# Patient Record
Sex: FEMALE | Race: WHITE | NOT HISPANIC OR LATINO | Employment: FULL TIME | URBAN - METROPOLITAN AREA
[De-identification: names, ages, dates, MRNs, and addresses within clinical notes are randomized per-mention and may not be internally consistent; named-entity substitution may affect disease eponyms.]

---

## 2023-07-24 ENCOUNTER — APPOINTMENT (OUTPATIENT)
Dept: PRIMARY CARE | Facility: CLINIC | Age: 48
End: 2023-07-24
Payer: COMMERCIAL

## 2023-07-26 ENCOUNTER — OFFICE VISIT (OUTPATIENT)
Dept: PRIMARY CARE | Facility: CLINIC | Age: 48
End: 2023-07-26
Payer: COMMERCIAL

## 2023-07-26 VITALS
OXYGEN SATURATION: 98 % | WEIGHT: 166 LBS | DIASTOLIC BLOOD PRESSURE: 78 MMHG | SYSTOLIC BLOOD PRESSURE: 122 MMHG | BODY MASS INDEX: 30.36 KG/M2 | HEART RATE: 107 BPM

## 2023-07-26 DIAGNOSIS — Z12.11 SCREENING FOR COLON CANCER: ICD-10-CM

## 2023-07-26 DIAGNOSIS — Z00.00 WELLNESS EXAMINATION: ICD-10-CM

## 2023-07-26 DIAGNOSIS — F41.8 DEPRESSION WITH ANXIETY: Primary | ICD-10-CM

## 2023-07-26 PROBLEM — I82.409 DVT (DEEP VENOUS THROMBOSIS) (MULTI): Status: RESOLVED | Noted: 2023-07-26 | Resolved: 2023-07-26

## 2023-07-26 PROBLEM — D68.9 COAGULATION DISORDER (MULTI): Status: ACTIVE | Noted: 2023-07-26

## 2023-07-26 PROBLEM — J06.9 VIRAL UPPER RESPIRATORY TRACT INFECTION: Status: RESOLVED | Noted: 2023-07-26 | Resolved: 2023-07-26

## 2023-07-26 PROBLEM — E55.9 VITAMIN D DEFICIENCY: Status: RESOLVED | Noted: 2023-07-26 | Resolved: 2023-07-26

## 2023-07-26 PROBLEM — L81.1 MELASMA: Status: RESOLVED | Noted: 2023-07-26 | Resolved: 2023-07-26

## 2023-07-26 PROBLEM — J35.8 TONSIL STONE: Status: RESOLVED | Noted: 2023-07-26 | Resolved: 2023-07-26

## 2023-07-26 PROBLEM — N94.9 VAGINAL LUMP: Status: RESOLVED | Noted: 2023-07-26 | Resolved: 2023-07-26

## 2023-07-26 PROBLEM — J32.9 SINUSITIS: Status: RESOLVED | Noted: 2023-07-26 | Resolved: 2023-07-26

## 2023-07-26 PROBLEM — G43.909 HEADACHE, MIGRAINE: Status: ACTIVE | Noted: 2023-07-26

## 2023-07-26 PROBLEM — J39.2 OROPHARYNGEAL LESION: Status: RESOLVED | Noted: 2023-07-26 | Resolved: 2023-07-26

## 2023-07-26 PROBLEM — R87.612 LOW GRADE SQUAMOUS INTRAEPITHELIAL LESION ON CYTOLOGIC SMEAR OF CERVIX (LGSIL): Status: RESOLVED | Noted: 2023-07-26 | Resolved: 2023-07-26

## 2023-07-26 PROCEDURE — 99213 OFFICE O/P EST LOW 20 MIN: CPT | Performed by: FAMILY MEDICINE

## 2023-07-26 RX ORDER — DESVENLAFAXINE 100 MG/1
100 TABLET, EXTENDED RELEASE ORAL DAILY
Qty: 90 TABLET | Refills: 1 | Status: SHIPPED | OUTPATIENT
Start: 2023-07-26 | End: 2023-08-28 | Stop reason: SDUPTHER

## 2023-07-26 RX ORDER — TOPIRAMATE 50 MG/1
TABLET, FILM COATED ORAL
COMMUNITY
Start: 2012-03-27 | End: 2023-08-28 | Stop reason: SDUPTHER

## 2023-07-26 RX ORDER — ACETAMINOPHEN 500 MG
TABLET ORAL
COMMUNITY

## 2023-07-26 RX ORDER — DESVENLAFAXINE 100 MG/1
1 TABLET, EXTENDED RELEASE ORAL DAILY
COMMUNITY
Start: 2013-01-02 | End: 2023-07-26 | Stop reason: SDUPTHER

## 2023-07-26 RX ORDER — ASPIRIN 81 MG/1
1 TABLET ORAL DAILY
COMMUNITY
Start: 2021-01-20

## 2023-07-26 ASSESSMENT — ENCOUNTER SYMPTOMS
LOSS OF SENSATION IN FEET: 0
DEPRESSION: 0
OCCASIONAL FEELINGS OF UNSTEADINESS: 0

## 2023-07-26 ASSESSMENT — PATIENT HEALTH QUESTIONNAIRE - PHQ9
2. FEELING DOWN, DEPRESSED OR HOPELESS: NOT AT ALL
SUM OF ALL RESPONSES TO PHQ9 QUESTIONS 1 AND 2: 0
1. LITTLE INTEREST OR PLEASURE IN DOING THINGS: NOT AT ALL

## 2023-07-26 NOTE — PROGRESS NOTES
Subjective   Patient ID: Jyotsna Loera is a 48 y.o. female who presents for Follow-up (FOLLOW UP).    HPI   Pt mood is stable  Pt denies cp, sob,edema, dizziness  Pt needs colonoscopy  Review of Systems   All other systems reviewed and are negative.      Objective   /78   Pulse 107   Wt 75.3 kg (166 lb)   SpO2 98%   BMI 30.36 kg/m²     Physical Exam  Constitutional:       Appearance: Normal appearance.   HENT:      Head: Normocephalic and atraumatic.      Right Ear: Tympanic membrane, ear canal and external ear normal.      Left Ear: Tympanic membrane, ear canal and external ear normal.      Nose: Nose normal.      Mouth/Throat:      Mouth: Mucous membranes are moist.      Pharynx: Oropharynx is clear.   Eyes:      Extraocular Movements: Extraocular movements intact.      Conjunctiva/sclera: Conjunctivae normal.      Pupils: Pupils are equal, round, and reactive to light.   Cardiovascular:      Rate and Rhythm: Normal rate and regular rhythm.      Pulses: Normal pulses.      Heart sounds: Normal heart sounds.   Pulmonary:      Effort: Pulmonary effort is normal.      Breath sounds: Normal breath sounds.   Abdominal:      General: Abdomen is flat. Bowel sounds are normal.      Palpations: Abdomen is soft.   Genitourinary:     Rectum: Normal.   Musculoskeletal:         General: Normal range of motion.      Cervical back: Normal range of motion and neck supple.   Skin:     General: Skin is warm and dry.      Capillary Refill: Capillary refill takes less than 2 seconds.   Neurological:      General: No focal deficit present.      Mental Status: She is alert and oriented to person, place, and time.   Psychiatric:         Mood and Affect: Mood normal.         Behavior: Behavior normal.         Thought Content: Thought content normal.         Assessment/Plan   Diagnoses and all orders for this visit:  Depression with anxiety  -     desvenlafaxine 100 mg 24 hr tablet; Take 1 tablet (100 mg) by mouth once  daily.  Screening for colon cancer  -     Colonoscopy; Future  Wellness examination  -     TSH with reflex to Free T4 if abnormal; Future  -     Lipid Panel; Future  -     Comprehensive Metabolic Panel; Future  -     CBC and Auto Differential; Future  -     Anti-Cardiolipin Antibody (IgA, IgG, and IgM); Future

## 2023-08-28 DIAGNOSIS — F41.8 DEPRESSION WITH ANXIETY: ICD-10-CM

## 2023-08-28 DIAGNOSIS — G43.801 OTHER MIGRAINE WITH STATUS MIGRAINOSUS, NOT INTRACTABLE: Primary | ICD-10-CM

## 2023-08-28 RX ORDER — TOPIRAMATE 50 MG/1
50 TABLET, FILM COATED ORAL DAILY
Qty: 90 TABLET | Refills: 1 | Status: SHIPPED | OUTPATIENT
Start: 2023-08-28 | End: 2024-01-09 | Stop reason: SDUPTHER

## 2023-08-28 RX ORDER — DESVENLAFAXINE 100 MG/1
100 TABLET, EXTENDED RELEASE ORAL DAILY
Qty: 90 TABLET | Refills: 0 | Status: SHIPPED | OUTPATIENT
Start: 2023-08-28 | End: 2023-12-02

## 2023-08-28 NOTE — TELEPHONE ENCOUNTER
PT CALLED REQUESTING A REFILL OF HER TOPAMAX & DESVENLAFAXINE TO THE [facility]. LAST APPT WAS 7.26.23 AND NEXT IS 1.10.24

## 2023-12-18 PROBLEM — R22.2 PALPABLE MASS OF LOWER BACK: Status: ACTIVE | Noted: 2023-12-18

## 2023-12-18 PROBLEM — R05.9 COUGH: Status: ACTIVE | Noted: 2023-12-18

## 2023-12-18 PROBLEM — D22.60 MELANOCYTIC NEVI OF UNSPECIFIED UPPER LIMB, INCLUDING SHOULDER: Status: ACTIVE | Noted: 2022-08-23

## 2023-12-18 PROBLEM — L30.9 ECZEMA: Status: ACTIVE | Noted: 2023-12-18

## 2023-12-18 PROBLEM — M54.50 LOW BACK PAIN: Status: ACTIVE | Noted: 2023-12-18

## 2023-12-18 PROBLEM — D22.5 MELANOCYTIC NEVI OF TRUNK: Status: ACTIVE | Noted: 2022-08-23

## 2023-12-18 PROBLEM — F41.9 ANXIETY: Status: ACTIVE | Noted: 2023-12-18

## 2023-12-18 PROBLEM — H69.90 EUSTACHIAN TUBE DYSFUNCTION: Status: ACTIVE | Noted: 2023-12-18

## 2023-12-18 PROBLEM — D22.4 MELANOCYTIC NEVI OF SCALP AND NECK: Status: ACTIVE | Noted: 2022-08-23

## 2023-12-18 PROBLEM — R07.9 CHEST PAIN: Status: ACTIVE | Noted: 2023-12-18

## 2023-12-18 PROBLEM — J30.9 ALLERGIC RHINITIS: Status: ACTIVE | Noted: 2023-12-18

## 2023-12-18 PROBLEM — D22.39 MELANOCYTIC NEVI OF OTHER PARTS OF FACE: Status: ACTIVE | Noted: 2022-08-23

## 2023-12-18 PROBLEM — D18.01 HEMANGIOMA OF SKIN AND SUBCUTANEOUS TISSUE: Status: ACTIVE | Noted: 2022-08-23

## 2023-12-18 PROBLEM — D22.9 ATYPICAL NEVI: Status: ACTIVE | Noted: 2023-12-18

## 2023-12-18 PROBLEM — L82.1 OTHER SEBORRHEIC KERATOSIS: Status: ACTIVE | Noted: 2022-08-23

## 2023-12-18 PROBLEM — J03.80 ACUTE TONSILLITIS DUE TO INFECTIOUS MONONUCLEOSIS: Status: ACTIVE | Noted: 2023-12-18

## 2023-12-18 PROBLEM — L57.9 SKIN CHANGES DUE TO CHRONIC EXPOSURE TO NONIONIZING RADIATION, UNSPECIFIED: Status: ACTIVE | Noted: 2022-08-23

## 2023-12-18 PROBLEM — L73.8 OTHER SPECIFIED FOLLICULAR DISORDERS: Status: ACTIVE | Noted: 2022-08-23

## 2023-12-18 PROBLEM — M79.606 LOWER EXTREMITY PAIN: Status: ACTIVE | Noted: 2023-12-18

## 2023-12-18 PROBLEM — D22.70 MELANOCYTIC NEVI OF UNSPECIFIED LOWER LIMB, INCLUDING HIP: Status: ACTIVE | Noted: 2022-08-23

## 2023-12-18 PROBLEM — B27.90 ACUTE TONSILLITIS DUE TO INFECTIOUS MONONUCLEOSIS: Status: ACTIVE | Noted: 2023-12-18

## 2023-12-18 PROBLEM — D48.5 NEOPLASM OF UNCERTAIN BEHAVIOR OF SKIN: Status: ACTIVE | Noted: 2022-08-23

## 2023-12-18 PROBLEM — J39.2 LESION OF THROAT: Status: ACTIVE | Noted: 2023-12-18

## 2023-12-18 PROBLEM — R76.0 ANTIPHOSPHOLIPID ANTIBODY POSITIVE: Status: ACTIVE | Noted: 2023-12-18

## 2023-12-18 PROBLEM — M89.8X8 MASS OF SPINE: Status: ACTIVE | Noted: 2023-12-18

## 2023-12-18 RX ORDER — OFLOXACIN 3 MG/ML
SOLUTION/ DROPS OPHTHALMIC
COMMUNITY
Start: 2023-11-04 | End: 2024-03-11 | Stop reason: ALTCHOICE

## 2023-12-18 RX ORDER — ERYTHROMYCIN 5 MG/G
OINTMENT OPHTHALMIC
COMMUNITY
Start: 2023-11-04 | End: 2024-03-11 | Stop reason: ALTCHOICE

## 2023-12-18 RX ORDER — CLINDAMYCIN PHOSPHATE 10 UG/ML
LOTION TOPICAL
COMMUNITY
Start: 2022-08-23

## 2024-01-04 ENCOUNTER — APPOINTMENT (OUTPATIENT)
Dept: HEMATOLOGY/ONCOLOGY | Facility: HOSPITAL | Age: 49
End: 2024-01-04
Payer: COMMERCIAL

## 2024-01-09 ENCOUNTER — TELEPHONE (OUTPATIENT)
Dept: PRIMARY CARE | Facility: CLINIC | Age: 49
End: 2024-01-09
Payer: COMMERCIAL

## 2024-01-09 DIAGNOSIS — G43.801 OTHER MIGRAINE WITH STATUS MIGRAINOSUS, NOT INTRACTABLE: ICD-10-CM

## 2024-01-09 DIAGNOSIS — F41.8 DEPRESSION WITH ANXIETY: ICD-10-CM

## 2024-01-09 RX ORDER — TOPIRAMATE 50 MG/1
50 TABLET, FILM COATED ORAL DAILY
Qty: 90 TABLET | Refills: 1 | Status: SHIPPED | OUTPATIENT
Start: 2024-01-09 | End: 2024-03-11 | Stop reason: SDUPTHER

## 2024-01-09 RX ORDER — DESVENLAFAXINE 100 MG/1
100 TABLET, EXTENDED RELEASE ORAL DAILY
Qty: 90 TABLET | Refills: 0 | Status: SHIPPED | OUTPATIENT
Start: 2024-01-09 | End: 2024-03-11 | Stop reason: SDUPTHER

## 2024-01-09 NOTE — TELEPHONE ENCOUNTER
Patient needs refills on Topamax 50 mg, desvenlafexine 100 mg called into [facility].  Next OV 3/11/2023.

## 2024-01-10 ENCOUNTER — APPOINTMENT (OUTPATIENT)
Dept: PRIMARY CARE | Facility: CLINIC | Age: 49
End: 2024-01-10
Payer: COMMERCIAL

## 2024-02-12 ENCOUNTER — OFFICE VISIT (OUTPATIENT)
Dept: HEMATOLOGY/ONCOLOGY | Facility: CLINIC | Age: 49
End: 2024-02-12
Payer: COMMERCIAL

## 2024-02-12 VITALS
SYSTOLIC BLOOD PRESSURE: 130 MMHG | OXYGEN SATURATION: 97 % | TEMPERATURE: 98.2 F | HEART RATE: 108 BPM | DIASTOLIC BLOOD PRESSURE: 81 MMHG | HEIGHT: 62 IN | WEIGHT: 163.14 LBS | BODY MASS INDEX: 30.02 KG/M2 | RESPIRATION RATE: 16 BRPM

## 2024-02-12 DIAGNOSIS — R76.0 ANTIPHOSPHOLIPID ANTIBODY POSITIVE: Primary | ICD-10-CM

## 2024-02-12 PROCEDURE — 99204 OFFICE O/P NEW MOD 45 MIN: CPT | Performed by: NURSE PRACTITIONER

## 2024-02-12 PROCEDURE — 99214 OFFICE O/P EST MOD 30 MIN: CPT | Performed by: NURSE PRACTITIONER

## 2024-02-12 ASSESSMENT — PATIENT HEALTH QUESTIONNAIRE - PHQ9
1. LITTLE INTEREST OR PLEASURE IN DOING THINGS: NOT AT ALL
SUM OF ALL RESPONSES TO PHQ9 QUESTIONS 1 AND 2: 0
2. FEELING DOWN, DEPRESSED OR HOPELESS: NOT AT ALL

## 2024-02-12 ASSESSMENT — COLUMBIA-SUICIDE SEVERITY RATING SCALE - C-SSRS
6. HAVE YOU EVER DONE ANYTHING, STARTED TO DO ANYTHING, OR PREPARED TO DO ANYTHING TO END YOUR LIFE?: NO
2. HAVE YOU ACTUALLY HAD ANY THOUGHTS OF KILLING YOURSELF?: NO
1. IN THE PAST MONTH, HAVE YOU WISHED YOU WERE DEAD OR WISHED YOU COULD GO TO SLEEP AND NOT WAKE UP?: NO

## 2024-02-12 ASSESSMENT — PAIN SCALES - GENERAL: PAINLEVEL: 0-NO PAIN

## 2024-02-12 NOTE — LETTER
February 12, 2024     Patient: Jyotsna Loera   YOB: 1975   Date of Visit: 2/12/2024       To Whom It May Concern:    Jyotsna Loera was seen in my clinic on 2/12/2024 at 1:30 pm. Please excuse Jyotsna for her absence from work on this day to make the appointment.    If you have any questions or concerns, please don't hesitate to call.         Sincerely,         Rosanne M Casal, APRN-CNP, DNP        CC: No Recipients

## 2024-02-12 NOTE — PROGRESS NOTES
Patient ID: Jyotsna Loera is a 48 y.o. female.  Referring Physician: Rosanne M Casal, APRN-CNP, DNP  [address]  [place] [phone]  Primary Care Provider: Berkley Christianson DO  Visit Type: Initial Visit - transfer of care from [facility]. Patient has not followed up in over a year.      Subjective    Interval History:    Location: [facility]     Today patient presents to clinic for transfer of care from [facility], LENNY to Rose Casal DNP     Pt is a pleasant 48-year-old  female was referred by Dr. Dhillon for evaluation  and anticoagulation management of a left lower extremity DVT dxed on 7/28/19.     Reports malar rash to face at times. + fatigue.       2019: pt c/o pain at tailbone and left posterior hip for 3-4 days duration before developed pain in left leg. She noted color changes of LLE which turned purple with swelling  then went to ER for further evaluation. u/s of LLE showed acute thrombus in the left common femoral vein extending into the calf veins. she was started with xarelto 15 mg BID, has been taking it for the last two weeks. , She noticed some improvement but  still c/o mild swelling and pain of the left lower extremity. she denies bleeding. In aug she was switched to lovenox after 2 weeks which dramatically improved the pain and swelling - continued for a couple months. Reports Xarelto was not working then.  she then resumed on xarelto.      pt reported taking OCP since 17 yrs of age till now only off during her pregnancy and delivery 10 years ago. now stopped. still smokes.  stated usually sits on her left leg whenever sitting in chair for the past 15 years, and now try to avoid sitting  that way.     noticed left leg purplish discoloration in 4/2020 went to ER and duplex u/s of LLE did not reveal evidence of DVT. it then resolved and sometimes occurs but denies pain or swelling of LE. d-dimer in 12/2019 was neg and has  "since off xarelto and taking  ASA 81 mg     still c/o mild left hip and left upper thigh pain. denies bleeding. denies leg swelling or discoloration.     PMH: anxiety depression, migraine     PSH: LEAP procedure     FH: father had MI        Review of Systems:   Review of Systems:    All of the systems have been reviewed and are negative for complaints except what is stated in the HPI and/or past medical history           Allergies and Intolerances:       Allergies:         penicillins: Drug Category, Hives/Urticaria, Anaphylaxis,  Active       Review of Systems   All other systems reviewed and are negative.       Objective   BSA: 1.79 meters squared  /81 (BP Location: Left arm, Patient Position: Sitting, BP Cuff Size: Adult)   Pulse 108   Temp 36.8 °C (98.2 °F) (Temporal)   Resp 16   Ht (S) 1.566 m (5' 1.65\")   Wt 74 kg (163 lb 2.3 oz)   SpO2 97%   BMI 30.17 kg/m²      has a past medical history of Acute vaginitis (06/01/2020), DVT (deep venous thrombosis) (CMS/[facility]) (07/26/2023), Encounter for contraceptive management, unspecified (09/23/2016), Encounter for issue of repeat prescription (06/22/2015), Melasma (07/26/2023), Oropharyngeal lesion (07/26/2023), Other specified anxiety disorders (09/23/2016), Other specified noninflammatory disorders of vagina (08/07/2020), Personal history of other infectious and parasitic diseases (12/21/2018), Personal history of other mental and behavioral disorders (06/13/2018), Personal history of other mental and behavioral disorders (12/07/2017), Personal history of other venous thrombosis and embolism (07/31/2019), Personal history of urinary (tract) infections (11/29/2022), Sinusitis (07/26/2023), Vaginal lump (07/26/2023), Viral upper respiratory tract infection (07/26/2023), and Vitamin D deficiency (07/26/2023).   has a past surgical history that includes Cervical biopsy w/ loop electrode excision (10/10/2013).  No family history on file.  Oncology History    No " history exists.       Jyotsna Loera  reports that she has been smoking cigarettes. She has never used smokeless tobacco.  She  reports that she does not currently use alcohol.  She  reports that she does not currently use drugs.    Physical Exam  HENT:      Head: Normocephalic.      Nose: Nose normal.      Mouth/Throat:      Mouth: Mucous membranes are moist.   Eyes:      Pupils: Pupils are equal, round, and reactive to light.   Cardiovascular:      Rate and Rhythm: Normal rate and regular rhythm.      Pulses: Normal pulses.      Heart sounds: Normal heart sounds.   Pulmonary:      Effort: Pulmonary effort is normal.      Breath sounds: Normal breath sounds.   Abdominal:      General: Bowel sounds are normal.      Palpations: Abdomen is soft.   Musculoskeletal:         General: Normal range of motion.   Skin:     General: Skin is warm and dry.   Neurological:      General: No focal deficit present.      Mental Status: She is alert and oriented to person, place, and time.   Psychiatric:         Mood and Affect: Mood normal.         Behavior: Behavior normal.         WBC   Date/Time Value Ref Range Status   02/01/2023 09:08 AM 8.3 4.4 - 11.3 x10E9/L Final   07/15/2022 06:48 AM 8.5 4.4 - 11.3 x10E9/L Final   02/06/2021 10:48 AM 9.1 4.4 - 11.3 x10E9/L Final     nRBC   Date Value Ref Range Status   02/01/2023 0.0 0.0 - 0.0 /100 WBC Final   07/15/2022 0.0 0.0 - 0.0 /100 WBC Final   02/06/2021 0.0 0.0 - 0.0 /100 WBC Final     RBC   Date Value Ref Range Status   02/01/2023 4.91 4.00 - 5.20 x10E12/L Final   07/15/2022 4.96 4.00 - 5.20 x10E12/L Final   02/06/2021 4.79 4.00 - 5.20 x10E12/L Final     Hemoglobin   Date Value Ref Range Status   02/01/2023 14.6 12.0 - 16.0 g/dL Final   07/15/2022 15.0 12.0 - 16.0 g/dL Final   02/06/2021 14.0 12.0 - 16.0 g/dL Final     Hematocrit   Date Value Ref Range Status   02/01/2023 45.0 36.0 - 46.0 % Final   07/15/2022 45.8 36.0 - 46.0 % Final   02/06/2021 44.3 36.0 - 46.0 % Final  "    MCV   Date/Time Value Ref Range Status   02/01/2023 09:08 AM 92 80 - 100 fL Final   07/15/2022 06:48 AM 92 80 - 100 fL Final   02/06/2021 10:48 AM 92 80 - 100 fL Final     No results found for: \"MCH\"  MCHC   Date/Time Value Ref Range Status   02/01/2023 09:08 AM 32.4 32.0 - 36.0 g/dL Final   07/15/2022 06:48 AM 32.8 32.0 - 36.0 g/dL Final   02/06/2021 10:48 AM 31.6 (L) 32.0 - 36.0 g/dL Final     RDW   Date/Time Value Ref Range Status   02/01/2023 09:08 AM 13.2 11.5 - 14.5 % Final   07/15/2022 06:48 AM 13.5 11.5 - 14.5 % Final   02/06/2021 10:48 AM 13.6 11.5 - 14.5 % Final     Platelets   Date/Time Value Ref Range Status   02/01/2023 09:08  150 - 450 x10E9/L Final   07/15/2022 06:48  150 - 450 x10E9/L Final   02/06/2021 10:48  150 - 450 x10E9/L Final     No results found for: \"MPV\"  Neutrophils %   Date/Time Value Ref Range Status   02/01/2023 09:08 AM 60.8 40.0 - 80.0 % Final   07/15/2022 06:48 AM 63.9 40.0 - 80.0 % Final   02/06/2021 10:48 AM 63.3 40.0 - 80.0 % Final     Immature Granulocytes %, Automated   Date/Time Value Ref Range Status   02/01/2023 09:08 AM 0.4 0.0 - 0.9 % Final     Comment:      Immature Granulocyte Count (IG) includes promyelocytes,    myelocytes and metamyelocytes but does not include bands.   Percent differential counts (%) should be interpreted in the   context of the absolute cell counts (cells/L).     07/15/2022 06:48 AM 0.8 0.0 - 0.9 % Final     Comment:      Immature Granulocyte Count (IG) includes promyelocytes,    myelocytes and metamyelocytes but does not include bands.   Percent differential counts (%) should be interpreted in the   context of the absolute cell counts (cells/L).     02/06/2021 10:48 AM 0.7 0.0 - 0.9 % Final     Comment:      Immature Granulocyte Count (IG) includes promyelocytes,    myelocytes and metamyelocytes but does not include bands.   Percent differential counts (%) should be interpreted in the   context of the absolute cell counts " "(cells/L).       Lymphocytes %   Date/Time Value Ref Range Status   02/01/2023 09:08 AM 30.2 13.0 - 44.0 % Final   07/15/2022 06:48 AM 28.0 13.0 - 44.0 % Final   02/06/2021 10:48 AM 28.2 13.0 - 44.0 % Final     Monocytes %   Date/Time Value Ref Range Status   02/01/2023 09:08 AM 7.9 2.0 - 10.0 % Final   07/15/2022 06:48 AM 7.0 2.0 - 10.0 % Final   02/06/2021 10:48 AM 7.3 2.0 - 10.0 % Final     Eosinophils %   Date/Time Value Ref Range Status   02/01/2023 09:08 AM 0.2 0.0 - 6.0 % Final   07/15/2022 06:48 AM 0.1 0.0 - 6.0 % Final   02/06/2021 10:48 AM 0.1 0.0 - 6.0 % Final     Basophils %   Date/Time Value Ref Range Status   02/01/2023 09:08 AM 0.5 0.0 - 2.0 % Final   07/15/2022 06:48 AM 0.2 0.0 - 2.0 % Final   02/06/2021 10:48 AM 0.4 0.0 - 2.0 % Final     Neutrophils Absolute   Date/Time Value Ref Range Status   02/01/2023 09:08 AM 5.07 1.20 - 7.70 x10E9/L Final   07/15/2022 06:48 AM 5.44 1.20 - 7.70 x10E9/L Final   02/06/2021 10:48 AM 5.78 1.20 - 7.70 x10E9/L Final     No results found for: \"IGABSOL\"  Lymphocytes Absolute   Date/Time Value Ref Range Status   02/01/2023 09:08 AM 2.52 1.20 - 4.80 x10E9/L Final   07/15/2022 06:48 AM 2.39 1.20 - 4.80 x10E9/L Final   02/06/2021 10:48 AM 2.58 1.20 - 4.80 x10E9/L Final     Monocytes Absolute   Date/Time Value Ref Range Status   02/01/2023 09:08 AM 0.66 0.10 - 1.00 x10E9/L Final   07/15/2022 06:48 AM 0.60 0.10 - 1.00 x10E9/L Final   02/06/2021 10:48 AM 0.67 0.10 - 1.00 x10E9/L Final     Eosinophils Absolute   Date/Time Value Ref Range Status   02/01/2023 09:08 AM 0.02 0.00 - 0.70 x10E9/L Final   07/15/2022 06:48 AM 0.01 0.00 - 0.70 x10E9/L Final   02/06/2021 10:48 AM 0.01 0.00 - 0.70 x10E9/L Final     Basophils Absolute   Date/Time Value Ref Range Status   02/01/2023 09:08 AM 0.04 0.00 - 0.10 x10E9/L Final   07/15/2022 06:48 AM 0.02 0.00 - 0.10 x10E9/L Final   02/06/2021 10:48 AM 0.04 0.00 - 0.10 x10E9/L Final       No components found for: \"PT\"  aPTT   Date/Time Value Ref " Range Status   04/02/2020 11:10 AM 40 (H) 28 - 38 sec Final     Comment:       THE APTT IS NO LONGER USED FOR MONITORING     UNFRACTIONATED HEPARIN THERAPY.    FOR MONITORING HEPARIN THERAPY,     USE THE HEPARIN ASSAY.     07/28/2019 01:21 PM 33 28 - 38 sec Final     Comment:       THE APTT IS NO LONGER USED FOR MONITORING     UNFRACTIONATED HEPARIN THERAPY.    FOR MONITORING HEPARIN THERAPY,     USE THE HEPARIN ASSAY.         Assessment/Plan    I have reviewed these laboratory results:     Beta 2 Glycoprotein Ab  09-Nov-2022 09:49:00       Result Value    Beta-2-Glycoprotein IgG Antibody  6.9    Beta-2-Glycoprotein IgA Antibody  >65.0   H   Beta-2-Glycoprotein IgM Antibody  0.2       Anticardiolipin Ab  09-Nov-2022 09:49:00       Result Value    Anticardiolipin IgG, Serum  9.2    Anticardiolipin IgA, Serum  >65.0   H   Anticardiolipin IgM, Serum  0.2          Assessment and Plan:      Assessment and Plan:   Assessment:    Location: [facility]     Today patient presents to clinic for transfer of care from JOSSUE Sharma-CNP.      pleasant 48-year-old  female was referred by Dr. Dhillon for evaluation of left lower extremity DVT dxed on 7/28/19.     Reports malar rash to face at times. + fatigue.       off xarelto since 12/2020 on ASA only     prothr gene and fvl and DRVVT all normal.      positive anticardiolipin ab IgA and beta 2 glycoprotein ab IgA x2 over 12 weeks apart. Discussed normally the IgG and IgM elevated confirm APAS.  IgA elevation is rare and can be seen in patients with lupus (had malar rash as well - will get YOVANNY). She did see rheumatology who told her she did not have lupus.      - As she has high levels of anticardiolipin ab IgA and beta 2 glycoprotein ab IgA this could put her at intermediate clotting risk.  Even so, has not had any repeat clots but has not been stressed with any invasive surgery, prolonged travel, acute  illness, etc. Patient is also a smoker which  increases her clotting risk.     We discussed that thrombosis occurs as the result of cumulative risk factors, both intrinsic and extrinsic, and duration of anticoagulation is determined by the severity of the clot, whether provoked or unprovoked by external cause such as a period of immobility, or genetic mutations, protein C and S deficiency, or APAS syndrome.        Plan:    Discussed with the patient in detail regarding diagnosis and treatment options. I will review this case with my collaborating MD, Dr Ami Marshall for further recommendations for anticoagulation. No labs today.     May cont ASA 81 mg daily, and to use Lovenox when risk factors rises as recommended by her former hematologist, until I discuss with Dr Marshall.      pt is educated on avoid risk factors for recurrence of DVT     Recommend compression stockings if edema recur.    I will call her with recommendations. I will plan to see her back every 6 months or sooner if questions or problems arise.      I had an extensive discussion with the patient regarding the diagnosis and discussed the plan of therapy, including general considerations regarding side effects and outcomes. Pt understood and gave appropriate teach back about the plan of care. All questions  were answered to the patient's satisfaction. The patient is instructed to contact us at any time if questions or problems arise. Thank you for the opportunity to participate in the care of this very pleasant patient.                 Rosanne M Casal, APRN-CNP, DNP

## 2024-03-11 ENCOUNTER — OFFICE VISIT (OUTPATIENT)
Dept: PRIMARY CARE | Facility: CLINIC | Age: 49
End: 2024-03-11
Payer: COMMERCIAL

## 2024-03-11 ENCOUNTER — TELEPHONE (OUTPATIENT)
Dept: HEMATOLOGY/ONCOLOGY | Facility: CLINIC | Age: 49
End: 2024-03-11

## 2024-03-11 VITALS
BODY MASS INDEX: 31.15 KG/M2 | WEIGHT: 165 LBS | HEIGHT: 61 IN | OXYGEN SATURATION: 98 % | DIASTOLIC BLOOD PRESSURE: 76 MMHG | SYSTOLIC BLOOD PRESSURE: 102 MMHG | HEART RATE: 116 BPM

## 2024-03-11 DIAGNOSIS — Z00.00 WELLNESS EXAMINATION: Primary | ICD-10-CM

## 2024-03-11 DIAGNOSIS — G43.801 OTHER MIGRAINE WITH STATUS MIGRAINOSUS, NOT INTRACTABLE: ICD-10-CM

## 2024-03-11 DIAGNOSIS — Z12.31 SCREENING MAMMOGRAM FOR BREAST CANCER: ICD-10-CM

## 2024-03-11 DIAGNOSIS — F41.8 DEPRESSION WITH ANXIETY: ICD-10-CM

## 2024-03-11 DIAGNOSIS — D68.9 COAGULATION DISORDER (MULTI): ICD-10-CM

## 2024-03-11 PROCEDURE — 99214 OFFICE O/P EST MOD 30 MIN: CPT | Performed by: FAMILY MEDICINE

## 2024-03-11 PROCEDURE — 99396 PREV VISIT EST AGE 40-64: CPT | Performed by: FAMILY MEDICINE

## 2024-03-11 RX ORDER — TOPIRAMATE 50 MG/1
50 TABLET, FILM COATED ORAL DAILY
Qty: 90 TABLET | Refills: 1 | Status: SHIPPED | OUTPATIENT
Start: 2024-03-11 | End: 2024-04-24

## 2024-03-11 RX ORDER — DESVENLAFAXINE 100 MG/1
100 TABLET, EXTENDED RELEASE ORAL DAILY
Qty: 90 TABLET | Refills: 1 | Status: SHIPPED | OUTPATIENT
Start: 2024-03-11

## 2024-03-11 ASSESSMENT — PATIENT HEALTH QUESTIONNAIRE - PHQ9
1. LITTLE INTEREST OR PLEASURE IN DOING THINGS: NOT AT ALL
2. FEELING DOWN, DEPRESSED OR HOPELESS: NOT AT ALL
SUM OF ALL RESPONSES TO PHQ9 QUESTIONS 1 AND 2: 0

## 2024-03-11 ASSESSMENT — ENCOUNTER SYMPTOMS
OCCASIONAL FEELINGS OF UNSTEADINESS: 0
DEPRESSION: 0
LOSS OF SENSATION IN FEET: 0

## 2024-03-11 NOTE — PROGRESS NOTES
"Subjective   Patient ID: Jyotsna Loera is a 48 y.o. female who presents for Annual Exam (YEARLY PHYSICAL).    HPI     Review of Systems   All other systems reviewed and are negative.  Pt feels like she has fluid in her l ear  Has concerns about dvts/antiphospholipid syndrome  Has had recurrent dvts r leg  Saw NP  Was told she would get back to her regarding therapy regimen    Objective   /76   Pulse (!) 116   Ht 1.549 m (5' 1\")   Wt 74.8 kg (165 lb)   SpO2 98%   BMI 31.18 kg/m²     Physical Exam  Constitutional:       Appearance: Normal appearance.   HENT:      Head: Normocephalic and atraumatic.      Right Ear: Tympanic membrane, ear canal and external ear normal.      Left Ear: Tympanic membrane, ear canal and external ear normal.      Nose: Nose normal.      Mouth/Throat:      Mouth: Mucous membranes are moist.      Pharynx: Oropharynx is clear.   Eyes:      Extraocular Movements: Extraocular movements intact.      Conjunctiva/sclera: Conjunctivae normal.      Pupils: Pupils are equal, round, and reactive to light.   Cardiovascular:      Rate and Rhythm: Normal rate and regular rhythm.      Pulses: Normal pulses.      Heart sounds: Normal heart sounds.   Pulmonary:      Effort: Pulmonary effort is normal.      Breath sounds: Normal breath sounds.   Abdominal:      General: Abdomen is flat. Bowel sounds are normal.      Palpations: Abdomen is soft.   Genitourinary:     Comments: nl  Musculoskeletal:         General: Normal range of motion.      Cervical back: Normal range of motion and neck supple.   Skin:     General: Skin is warm and dry.      Capillary Refill: Capillary refill takes less than 2 seconds.   Neurological:      General: No focal deficit present.      Mental Status: She is alert and oriented to person, place, and time.   Psychiatric:         Mood and Affect: Mood normal.         Behavior: Behavior normal.         Thought Content: Thought content normal.         Assessment/Plan "   Diagnoses and all orders for this visit:  Wellness examination  -     TSH with reflex to Free T4 if abnormal; Future  -     Lipid Panel; Future  -     Comprehensive Metabolic Panel; Future  -     CBC and Auto Differential; Future  Screening mammogram for breast cancer  -     BI mammo bilateral screening tomosynthesis; Future  Depression with anxiety  -     desvenlafaxine 100 mg 24 hr tablet; Take 1 tablet (100 mg) by mouth once daily.  Coagulation disorder (CMS/HCC)  -     Referral to Benign Hematology; Future  -     apixaban (Eliquis) 5 mg tablet; Take 1 tablet (5 mg) by mouth 2 times a day.  -     Anti-Cardiolipin Antibody (IgA, IgG, and IgM); Future  Other migraine with status migrainosus, not intractable  -     topiramate (Topamax) 50 mg tablet; Take 1 tablet (50 mg) by mouth once daily.

## 2024-03-11 NOTE — TELEPHONE ENCOUNTER
Jyotsna called because she saw Kathy in January. She said the plan was that Kathy was going to check with a colleague about something and get back with Jyotsna about a plan. Jyotsna said she has not heard anything and that she has checked mychart as well. [phone]

## 2024-03-14 ENCOUNTER — HOSPITAL ENCOUNTER (OUTPATIENT)
Dept: RADIOLOGY | Facility: CLINIC | Age: 49
Discharge: HOME | End: 2024-03-14
Payer: COMMERCIAL

## 2024-03-14 VITALS — WEIGHT: 165 LBS | HEIGHT: 61 IN | BODY MASS INDEX: 31.15 KG/M2

## 2024-03-14 DIAGNOSIS — Z12.31 SCREENING MAMMOGRAM FOR BREAST CANCER: ICD-10-CM

## 2024-03-14 PROCEDURE — 77067 SCR MAMMO BI INCL CAD: CPT | Performed by: STUDENT IN AN ORGANIZED HEALTH CARE EDUCATION/TRAINING PROGRAM

## 2024-03-14 PROCEDURE — 77063 BREAST TOMOSYNTHESIS BI: CPT | Performed by: STUDENT IN AN ORGANIZED HEALTH CARE EDUCATION/TRAINING PROGRAM

## 2024-03-14 PROCEDURE — 77067 SCR MAMMO BI INCL CAD: CPT

## 2024-04-08 ENCOUNTER — LAB (OUTPATIENT)
Dept: LAB | Facility: LAB | Age: 49
End: 2024-04-08
Payer: COMMERCIAL

## 2024-04-08 DIAGNOSIS — D68.9 COAGULATION DISORDER (MULTI): ICD-10-CM

## 2024-04-08 DIAGNOSIS — Z00.00 WELLNESS EXAMINATION: ICD-10-CM

## 2024-04-08 LAB
ALBUMIN SERPL BCP-MCNC: 4.1 G/DL (ref 3.4–5)
ALP SERPL-CCNC: 77 U/L (ref 33–110)
ALT SERPL W P-5'-P-CCNC: 17 U/L (ref 7–45)
ANION GAP SERPL CALC-SCNC: 14 MMOL/L (ref 10–20)
AST SERPL W P-5'-P-CCNC: 16 U/L (ref 9–39)
BASOPHILS # BLD AUTO: 0.04 X10*3/UL (ref 0–0.1)
BASOPHILS NFR BLD AUTO: 0.5 %
BILIRUB SERPL-MCNC: 0.5 MG/DL (ref 0–1.2)
BUN SERPL-MCNC: 8 MG/DL (ref 6–23)
CALCIUM SERPL-MCNC: 9.5 MG/DL (ref 8.6–10.6)
CARDIOLIPIN IGA SERPL-ACNC: >65 APL U/ML
CARDIOLIPIN IGG SER IA-ACNC: 8.7 GPL U/ML
CARDIOLIPIN IGM SER IA-ACNC: <0.2 MPL U/ML
CHLORIDE SERPL-SCNC: 105 MMOL/L (ref 98–107)
CHOLEST SERPL-MCNC: 191 MG/DL (ref 0–199)
CHOLESTEROL/HDL RATIO: 3.4
CO2 SERPL-SCNC: 26 MMOL/L (ref 21–32)
CREAT SERPL-MCNC: 0.76 MG/DL (ref 0.5–1.05)
EGFRCR SERPLBLD CKD-EPI 2021: >90 ML/MIN/1.73M*2
EOSINOPHIL # BLD AUTO: 0.08 X10*3/UL (ref 0–0.7)
EOSINOPHIL NFR BLD AUTO: 1.1 %
ERYTHROCYTE [DISTWIDTH] IN BLOOD BY AUTOMATED COUNT: 13.5 % (ref 11.5–14.5)
GLUCOSE SERPL-MCNC: 83 MG/DL (ref 74–99)
HCT VFR BLD AUTO: 44.3 % (ref 36–46)
HDLC SERPL-MCNC: 55.5 MG/DL
HGB BLD-MCNC: 14.6 G/DL (ref 12–16)
IMM GRANULOCYTES # BLD AUTO: 0.06 X10*3/UL (ref 0–0.7)
IMM GRANULOCYTES NFR BLD AUTO: 0.8 % (ref 0–0.9)
LDLC SERPL CALC-MCNC: 117 MG/DL
LYMPHOCYTES # BLD AUTO: 2.26 X10*3/UL (ref 1.2–4.8)
LYMPHOCYTES NFR BLD AUTO: 31 %
MCH RBC QN AUTO: 30.2 PG (ref 26–34)
MCHC RBC AUTO-ENTMCNC: 33 G/DL (ref 32–36)
MCV RBC AUTO: 92 FL (ref 80–100)
MONOCYTES # BLD AUTO: 0.58 X10*3/UL (ref 0.1–1)
MONOCYTES NFR BLD AUTO: 8 %
NEUTROPHILS # BLD AUTO: 4.27 X10*3/UL (ref 1.2–7.7)
NEUTROPHILS NFR BLD AUTO: 58.6 %
NON HDL CHOLESTEROL: 136 MG/DL (ref 0–149)
NRBC BLD-RTO: 0 /100 WBCS (ref 0–0)
PLATELET # BLD AUTO: 383 X10*3/UL (ref 150–450)
POTASSIUM SERPL-SCNC: 4.1 MMOL/L (ref 3.5–5.3)
PROT SERPL-MCNC: 6.7 G/DL (ref 6.4–8.2)
RBC # BLD AUTO: 4.84 X10*6/UL (ref 4–5.2)
SODIUM SERPL-SCNC: 141 MMOL/L (ref 136–145)
TRIGL SERPL-MCNC: 91 MG/DL (ref 0–149)
TSH SERPL-ACNC: 0.92 MIU/L (ref 0.44–3.98)
VLDL: 18 MG/DL (ref 0–40)
WBC # BLD AUTO: 7.3 X10*3/UL (ref 4.4–11.3)

## 2024-04-08 PROCEDURE — 85025 COMPLETE CBC W/AUTO DIFF WBC: CPT

## 2024-04-08 PROCEDURE — 80053 COMPREHEN METABOLIC PANEL: CPT

## 2024-04-08 PROCEDURE — 36415 COLL VENOUS BLD VENIPUNCTURE: CPT

## 2024-04-08 PROCEDURE — 80061 LIPID PANEL: CPT

## 2024-04-08 PROCEDURE — 84443 ASSAY THYROID STIM HORMONE: CPT

## 2024-04-08 PROCEDURE — 86147 CARDIOLIPIN ANTIBODY EA IG: CPT

## 2024-04-20 DIAGNOSIS — D68.9 COAGULATION DISORDER (MULTI): ICD-10-CM

## 2024-04-24 DIAGNOSIS — G43.801 OTHER MIGRAINE WITH STATUS MIGRAINOSUS, NOT INTRACTABLE: ICD-10-CM

## 2024-04-24 RX ORDER — TOPIRAMATE 50 MG/1
150 TABLET, FILM COATED ORAL DAILY
Qty: 270 TABLET | Refills: 1 | Status: SHIPPED | OUTPATIENT
Start: 2024-04-24

## 2024-06-07 ENCOUNTER — APPOINTMENT (OUTPATIENT)
Dept: HEMATOLOGY/ONCOLOGY | Facility: CLINIC | Age: 49
End: 2024-06-07
Payer: COMMERCIAL

## 2024-08-12 ENCOUNTER — APPOINTMENT (OUTPATIENT)
Dept: HEMATOLOGY/ONCOLOGY | Facility: CLINIC | Age: 49
End: 2024-08-12
Payer: COMMERCIAL

## 2024-08-12 ENCOUNTER — OFFICE VISIT (OUTPATIENT)
Dept: HEMATOLOGY/ONCOLOGY | Facility: CLINIC | Age: 49
End: 2024-08-12
Payer: COMMERCIAL

## 2024-08-12 VITALS
TEMPERATURE: 98.6 F | BODY MASS INDEX: 31.26 KG/M2 | WEIGHT: 165.46 LBS | RESPIRATION RATE: 18 BRPM | HEART RATE: 105 BPM | OXYGEN SATURATION: 95 % | SYSTOLIC BLOOD PRESSURE: 119 MMHG | DIASTOLIC BLOOD PRESSURE: 80 MMHG

## 2024-08-12 DIAGNOSIS — D68.9 COAGULATION DISORDER (MULTI): ICD-10-CM

## 2024-08-12 PROCEDURE — 99215 OFFICE O/P EST HI 40 MIN: CPT | Performed by: INTERNAL MEDICINE

## 2024-08-12 ASSESSMENT — ENCOUNTER SYMPTOMS
RESPIRATORY NEGATIVE: 1
CONSTITUTIONAL NEGATIVE: 1
CARDIOVASCULAR NEGATIVE: 1
GASTROINTESTINAL NEGATIVE: 1
EYES NEGATIVE: 1

## 2024-08-12 ASSESSMENT — PAIN SCALES - GENERAL: PAINLEVEL: 0-NO PAIN

## 2024-08-12 NOTE — PROGRESS NOTES
Patient ID: Jyotsna Loera is a 49 y.o. female.  Referring Physician: Berkley Christianson DO  [address] [facility]  [place] [address]  Primary Care Provider: Berkley Christianson DO  Visit Type:  Follow Up     Subjective    HPI  49-year-old  female was referred by Dr. Dhillon for evaluation  and anticoagulation management of a left lower extremity DVT dxed on 7/28/19.     pt reported taking OCP since 17 yrs of age till now only off during her pregnancy and delivery 10 years ago. now stopped. still smokes.     2019: pt c/o pain at tailbone and left posterior hip for 3-4 days duration before developed pain in left leg. She noted color changes of LLE which turned purple with swelling  then went to ER for further evaluation. u/s of LLE showed acute thrombus in the left common femoral vein extending into the calf veins. she was started with xarelto 15 mg BID     DX: APLA syndrome: recommend Coumadin clinic.  Review of Systems   Constitutional: Negative.    HENT:  Negative.     Eyes: Negative.    Respiratory: Negative.     Cardiovascular: Negative.    Gastrointestinal: Negative.         Objective   BSA: 1.8 meters squared  /80 (BP Location: Left arm, Patient Position: Sitting, BP Cuff Size: Adult)   Pulse 105   Temp 37 °C (98.6 °F) (Temporal)   Resp 18   Wt 75 kg (165 lb 7.3 oz)   SpO2 95%   BMI 31.26 kg/m²      has a past medical history of Acute vaginitis (06/01/2020), DVT (deep venous thrombosis) (Multi) (07/26/2023), Encounter for contraceptive management, unspecified (09/23/2016), Encounter for issue of repeat prescription (06/22/2015), Melasma (07/26/2023), Oropharyngeal lesion (07/26/2023), Other specified anxiety disorders (09/23/2016), Other specified noninflammatory disorders of vagina (08/07/2020), Personal history of other infectious and parasitic diseases (12/21/2018), Personal history of other mental and behavioral disorders (06/13/2018),  Personal history of other mental and behavioral disorders (12/07/2017), Personal history of other venous thrombosis and embolism (07/31/2019), Personal history of urinary (tract) infections (11/29/2022), Sinusitis (07/26/2023), Vaginal lump (07/26/2023), Viral upper respiratory tract infection (07/26/2023), and Vitamin D deficiency (07/26/2023).   has a past surgical history that includes Cervical biopsy w/ loop electrode excision (10/10/2013).  Family History   Problem Relation Name Age of Onset    Breast cancer Father  68    Breast cancer Maternal Grandmother  59     Oncology History    No history exists.       Jyotsna Loera  reports that she has been smoking cigarettes. She has never used smokeless tobacco.  She  reports that she does not currently use alcohol.  She  reports that she does not currently use drugs.    Physical Exam  Constitutional:       Appearance: Normal appearance.   HENT:      Head: Normocephalic and atraumatic.   Eyes:      Extraocular Movements: Extraocular movements intact.      Pupils: Pupils are equal, round, and reactive to light.   Neurological:      Mental Status: She is alert.         WBC   Date/Time Value Ref Range Status   04/08/2024 07:17 AM 7.3 4.4 - 11.3 x10*3/uL Final   02/01/2023 09:08 AM 8.3 4.4 - 11.3 x10E9/L Final   07/15/2022 06:48 AM 8.5 4.4 - 11.3 x10E9/L Final   02/06/2021 10:48 AM 9.1 4.4 - 11.3 x10E9/L Final     nRBC   Date Value Ref Range Status   04/08/2024 0.0 0.0 - 0.0 /100 WBCs Final   02/01/2023 0.0 0.0 - 0.0 /100 WBC Final   07/15/2022 0.0 0.0 - 0.0 /100 WBC Final   02/06/2021 0.0 0.0 - 0.0 /100 WBC Final     RBC   Date Value Ref Range Status   04/08/2024 4.84 4.00 - 5.20 x10*6/uL Final   02/01/2023 4.91 4.00 - 5.20 x10E12/L Final   07/15/2022 4.96 4.00 - 5.20 x10E12/L Final   02/06/2021 4.79 4.00 - 5.20 x10E12/L Final     Hemoglobin   Date Value Ref Range Status   04/08/2024 14.6 12.0 - 16.0 g/dL Final   02/01/2023 14.6 12.0 - 16.0 g/dL Final   07/15/2022  "15.0 12.0 - 16.0 g/dL Final   02/06/2021 14.0 12.0 - 16.0 g/dL Final     Hematocrit   Date Value Ref Range Status   04/08/2024 44.3 36.0 - 46.0 % Final   02/01/2023 45.0 36.0 - 46.0 % Final   07/15/2022 45.8 36.0 - 46.0 % Final   02/06/2021 44.3 36.0 - 46.0 % Final     MCV   Date/Time Value Ref Range Status   04/08/2024 07:17 AM 92 80 - 100 fL Final   02/01/2023 09:08 AM 92 80 - 100 fL Final   07/15/2022 06:48 AM 92 80 - 100 fL Final   02/06/2021 10:48 AM 92 80 - 100 fL Final     MCH   Date/Time Value Ref Range Status   04/08/2024 07:17 AM 30.2 26.0 - 34.0 pg Final     MCHC   Date/Time Value Ref Range Status   04/08/2024 07:17 AM 33.0 32.0 - 36.0 g/dL Final   02/01/2023 09:08 AM 32.4 32.0 - 36.0 g/dL Final   07/15/2022 06:48 AM 32.8 32.0 - 36.0 g/dL Final   02/06/2021 10:48 AM 31.6 (L) 32.0 - 36.0 g/dL Final     RDW   Date/Time Value Ref Range Status   04/08/2024 07:17 AM 13.5 11.5 - 14.5 % Final   02/01/2023 09:08 AM 13.2 11.5 - 14.5 % Final   07/15/2022 06:48 AM 13.5 11.5 - 14.5 % Final   02/06/2021 10:48 AM 13.6 11.5 - 14.5 % Final     Platelets   Date/Time Value Ref Range Status   04/08/2024 07:17  150 - 450 x10*3/uL Final   02/01/2023 09:08  150 - 450 x10E9/L Final   07/15/2022 06:48  150 - 450 x10E9/L Final   02/06/2021 10:48  150 - 450 x10E9/L Final     No results found for: \"MPV\"  Neutrophils %   Date/Time Value Ref Range Status   04/08/2024 07:17 AM 58.6 40.0 - 80.0 % Final   02/01/2023 09:08 AM 60.8 40.0 - 80.0 % Final   07/15/2022 06:48 AM 63.9 40.0 - 80.0 % Final   02/06/2021 10:48 AM 63.3 40.0 - 80.0 % Final     Immature Granulocytes %, Automated   Date/Time Value Ref Range Status   04/08/2024 07:17 AM 0.8 0.0 - 0.9 % Final     Comment:     Immature Granulocyte Count (IG) includes promyelocytes, myelocytes and metamyelocytes but does not include bands. Percent differential counts (%) should be interpreted in the context of the absolute cell counts (cells/UL).   02/01/2023 09:08 " AM 0.4 0.0 - 0.9 % Final     Comment:      Immature Granulocyte Count (IG) includes promyelocytes,    myelocytes and metamyelocytes but does not include bands.   Percent differential counts (%) should be interpreted in the   context of the absolute cell counts (cells/L).     07/15/2022 06:48 AM 0.8 0.0 - 0.9 % Final     Comment:      Immature Granulocyte Count (IG) includes promyelocytes,    myelocytes and metamyelocytes but does not include bands.   Percent differential counts (%) should be interpreted in the   context of the absolute cell counts (cells/L).     02/06/2021 10:48 AM 0.7 0.0 - 0.9 % Final     Comment:      Immature Granulocyte Count (IG) includes promyelocytes,    myelocytes and metamyelocytes but does not include bands.   Percent differential counts (%) should be interpreted in the   context of the absolute cell counts (cells/L).       Lymphocytes %   Date/Time Value Ref Range Status   04/08/2024 07:17 AM 31.0 13.0 - 44.0 % Final   02/01/2023 09:08 AM 30.2 13.0 - 44.0 % Final   07/15/2022 06:48 AM 28.0 13.0 - 44.0 % Final   02/06/2021 10:48 AM 28.2 13.0 - 44.0 % Final     Monocytes %   Date/Time Value Ref Range Status   04/08/2024 07:17 AM 8.0 2.0 - 10.0 % Final   02/01/2023 09:08 AM 7.9 2.0 - 10.0 % Final   07/15/2022 06:48 AM 7.0 2.0 - 10.0 % Final   02/06/2021 10:48 AM 7.3 2.0 - 10.0 % Final     Eosinophils %   Date/Time Value Ref Range Status   04/08/2024 07:17 AM 1.1 0.0 - 6.0 % Final   02/01/2023 09:08 AM 0.2 0.0 - 6.0 % Final   07/15/2022 06:48 AM 0.1 0.0 - 6.0 % Final   02/06/2021 10:48 AM 0.1 0.0 - 6.0 % Final     Basophils %   Date/Time Value Ref Range Status   04/08/2024 07:17 AM 0.5 0.0 - 2.0 % Final   02/01/2023 09:08 AM 0.5 0.0 - 2.0 % Final   07/15/2022 06:48 AM 0.2 0.0 - 2.0 % Final   02/06/2021 10:48 AM 0.4 0.0 - 2.0 % Final     Neutrophils Absolute   Date/Time Value Ref Range Status   04/08/2024 07:17 AM 4.27 1.20 - 7.70 x10*3/uL Final     Comment:     Percent differential counts  "(%) should be interpreted in the context of the absolute cell counts (cells/uL).   02/01/2023 09:08 AM 5.07 1.20 - 7.70 x10E9/L Final   07/15/2022 06:48 AM 5.44 1.20 - 7.70 x10E9/L Final   02/06/2021 10:48 AM 5.78 1.20 - 7.70 x10E9/L Final     Immature Granulocytes Absolute, Automated   Date/Time Value Ref Range Status   04/08/2024 07:17 AM 0.06 0.00 - 0.70 x10*3/uL Final     Lymphocytes Absolute   Date/Time Value Ref Range Status   04/08/2024 07:17 AM 2.26 1.20 - 4.80 x10*3/uL Final   02/01/2023 09:08 AM 2.52 1.20 - 4.80 x10E9/L Final   07/15/2022 06:48 AM 2.39 1.20 - 4.80 x10E9/L Final   02/06/2021 10:48 AM 2.58 1.20 - 4.80 x10E9/L Final     Monocytes Absolute   Date/Time Value Ref Range Status   04/08/2024 07:17 AM 0.58 0.10 - 1.00 x10*3/uL Final   02/01/2023 09:08 AM 0.66 0.10 - 1.00 x10E9/L Final   07/15/2022 06:48 AM 0.60 0.10 - 1.00 x10E9/L Final   02/06/2021 10:48 AM 0.67 0.10 - 1.00 x10E9/L Final     Eosinophils Absolute   Date/Time Value Ref Range Status   04/08/2024 07:17 AM 0.08 0.00 - 0.70 x10*3/uL Final   02/01/2023 09:08 AM 0.02 0.00 - 0.70 x10E9/L Final   07/15/2022 06:48 AM 0.01 0.00 - 0.70 x10E9/L Final   02/06/2021 10:48 AM 0.01 0.00 - 0.70 x10E9/L Final     Basophils Absolute   Date/Time Value Ref Range Status   04/08/2024 07:17 AM 0.04 0.00 - 0.10 x10*3/uL Final   02/01/2023 09:08 AM 0.04 0.00 - 0.10 x10E9/L Final   07/15/2022 06:48 AM 0.02 0.00 - 0.10 x10E9/L Final   02/06/2021 10:48 AM 0.04 0.00 - 0.10 x10E9/L Final       No components found for: \"PT\"  aPTT   Date/Time Value Ref Range Status   04/02/2020 11:10 AM 40 (H) 28 - 38 sec Final     Comment:       THE APTT IS NO LONGER USED FOR MONITORING     UNFRACTIONATED HEPARIN THERAPY.    FOR MONITORING HEPARIN THERAPY,     USE THE HEPARIN ASSAY.     07/28/2019 01:21 PM 33 28 - 38 sec Final     Comment:       THE APTT IS NO LONGER USED FOR MONITORING     UNFRACTIONATED HEPARIN THERAPY.    FOR MONITORING HEPARIN THERAPY,     USE THE HEPARIN " ASSAY.         Assessment/Plan      she has high levels of anticardiolipin ab IgA and beta 2 glycoprotein ab IgA this could put her at intermediate clotting risk.   Prothr gene and fvl and DRVVT all normal.     APLA syndrome: Recommend Coumadin clinic: INR 2-3: RTC prn FU with PCP mostly and consult PRN.     Diagnoses and all orders for this visit:  Coagulation disorder (Multi)  -     Referral to Benign Hematology  -     Referral to Anticoagulation-Warfarin Monitoring-Pharmacist Clinic           Ge Mcnair MD

## 2024-08-12 NOTE — PATIENT INSTRUCTIONS
Today you met with your hematologist/oncologist.  Recent labs were discussed and questions answered.  Scheduling orders were placed.  While we appreciate that you verbalized understanding, if any questions arise after leaving, please do not hesitate to call the office to discuss.  [phone] Giovana Castro.  You do not need to return to the clinic at this time. A referral has been placed to the coumadin clinic. If there are any changes and you feel you need seen, call our office right away.

## 2024-08-13 DIAGNOSIS — D68.9 COAGULATION DISORDER (MULTI): Primary | ICD-10-CM

## 2024-08-13 RX ORDER — WARFARIN SODIUM 5 MG/1
TABLET ORAL
Qty: 30 TABLET | Refills: 3 | Status: SHIPPED | OUTPATIENT
Start: 2024-08-13

## 2024-08-13 NOTE — PROGRESS NOTES
Patient was instructed to continue her eliquis as directed by Dr. Mcnair for bridging. We will start warfarin 5mg tablet once daily. INR check on Friday.

## 2024-08-16 ENCOUNTER — ANTICOAGULATION - WARFARIN VISIT (OUTPATIENT)
Dept: PHARMACY | Facility: HOSPITAL | Age: 49
End: 2024-08-16
Payer: COMMERCIAL

## 2024-08-16 DIAGNOSIS — D68.9 COAGULATION DISORDER (MULTI): Primary | ICD-10-CM

## 2024-08-16 LAB
POC INR: 1.3 (ref 2–3)
POC PROTHROMBIN TIME: 16.1

## 2024-08-16 PROCEDURE — 85610 PROTHROMBIN TIME: CPT | Mod: QW | Performed by: INTERNAL MEDICINE

## 2024-08-16 PROCEDURE — 99211 OFF/OP EST MAY X REQ PHY/QHP: CPT

## 2024-08-16 NOTE — PROGRESS NOTES
Ms. Loera is a consult from Dr. Mcnair for the management of her warfarin. She is on warfarin for antiphospholipid syndrome. She is positive for both ab IgA and beta 2 glycoprotein ab IgA. She was previously on eliquis. Dr. Mcnair wanted her to be bridged with the eliquis onto warfarin. We went over how our clinic works and what to expect. She is on topiramate and desvenlafaxine. She does smoke about a pack a day. We discussed how this impacts the INR. She started the warfarin on August 13th and started 5mg daily. She does do dark greens about once a week. No changes in medications or diet. She typically eats about 1 ½ meals a day. We went over what impacts the INR and when she should be contacting the clinic. She has been bridging with the eliquis. Given her INR is at 1.3 today, we will continue her on 5mg daily. She will continue the bridging with the eliquis.  We will follow up next week.

## 2024-08-16 NOTE — PATIENT INSTRUCTIONS
Your INR is low at 1.3. Please continue on your eliquis twice daily. Please continue the warfarin of 5mg daily. We will follow up next week.     If any questions arise do not hesitate to call us at [phone] M-F from 8:30am-4:30pm or at   [phone] after 5pm or on the weekends. Continue to monitor for any excess bleeding or bruising, especially for blood in your stool.

## 2024-08-19 ENCOUNTER — ANTICOAGULATION - WARFARIN VISIT (OUTPATIENT)
Dept: PHARMACY | Facility: HOSPITAL | Age: 49
End: 2024-08-19
Payer: COMMERCIAL

## 2024-08-19 DIAGNOSIS — D68.9 COAGULATION DISORDER (MULTI): Primary | ICD-10-CM

## 2024-08-19 LAB
POC INR: 1.8 (ref 2–3)
POC PROTHROMBIN TIME: 21.8

## 2024-08-19 PROCEDURE — 99211 OFF/OP EST MAY X REQ PHY/QHP: CPT

## 2024-08-19 PROCEDURE — 85610 PROTHROMBIN TIME: CPT | Mod: QW | Performed by: INTERNAL MEDICINE

## 2024-08-19 NOTE — PROGRESS NOTES
Ms. Loera is a consult from Dr. Mcnair for the management of her warfarin. She is on warfarin for antiphospholipid syndrome. She is positive for both ab IgA and beta 2 glycoprotein ab IgA. She was previously on eliquis. Dr. Mcnair wanted her to be bridged with the eliquis onto warfarin. We went over how our clinic works and what to expect. She is on topiramate and desvenlafaxine. She does smoke about a pack a day. We discussed how this impacts the INR. She started the warfarin on August 13th and started 5mg daily. She does do dark greens about once a week, she didn't have any greens this past week. No changes in medications or diet. She typically eats about 1 ½ meals a day. She has been bridging with the eliquis. Given her INR is at 1.8 today, we will continue her on 5mg daily. She will continue the bridging with the eliquis and stop it after Wednesday evening's dose.  We will follow up on Thursday.

## 2024-08-19 NOTE — PATIENT INSTRUCTIONS
Your INR is low at 1.8. Please continue on your eliquis twice daily and stop after Wednesday evening's dose. Please continue the warfarin of 5mg daily. We will follow up Thursday.     If any questions arise do not hesitate to call us at [phone] M-F from 8:30am-4:30pm or at   [phone] after 5pm or on the weekends. Continue to monitor for any excess bleeding or bruising, especially for blood in your stool.

## 2024-08-22 ENCOUNTER — ANTICOAGULATION - WARFARIN VISIT (OUTPATIENT)
Dept: PHARMACY | Facility: HOSPITAL | Age: 49
End: 2024-08-22
Payer: COMMERCIAL

## 2024-08-22 DIAGNOSIS — D68.9 COAGULATION DISORDER (MULTI): Primary | ICD-10-CM

## 2024-08-22 LAB
POC INR: 1.8 (ref 2–3)
POC PROTHROMBIN TIME: 21.5

## 2024-08-22 PROCEDURE — 85610 PROTHROMBIN TIME: CPT | Mod: QW | Performed by: INTERNAL MEDICINE

## 2024-08-22 PROCEDURE — 99211 OFF/OP EST MAY X REQ PHY/QHP: CPT

## 2024-08-22 NOTE — PATIENT INSTRUCTIONS
Your INR is low at 1.8. Please continue on your eliquis through Saturday night. Please boost today with 7.5mg and then increase your weekly regimen to 7.5mg M,F and 5mg all other days. We will follow up next week.    If any questions arise do not hesitate to call us at [phone] M-F from 8:30am-4:30pm or at   [phone] after 5pm or on the weekends. Continue to monitor for any excess bleeding or bruising, especially for blood in your stool.

## 2024-08-22 NOTE — PROGRESS NOTES
Ms. Loera is a consult from Dr. Mcnair for the management of her warfarin. She is on warfarin for antiphospholipid syndrome. She is positive for both ab IgA and beta 2 glycoprotein ab IgA. She was previously on eliquis. Dr. Mcnair wanted her to be bridged with the eliquis onto warfarin. We went over how our clinic works and what to expect. She is on topiramate and desvenlafaxine. She does smoke about a pack a day. We discussed how this impacts the INR. She started the warfarin on August 13th and started 5mg daily. She does do dark greens about once a week, she didn't have any greens this past week but did have some spinach dip the other night. No changes in medications or diet. She typically eats about 1 ½ meals a day. She has been bridging with the eliquis. We discussed to continue the eliquis through Saturday night. Given her INR is at 1.8, we will boost today with 7.5mg and then increase her weekly regimen to 7.5mg M,F and 5mg all other days. We discussed she can start incorporating a little green into her diet.  We will follow up next week.

## 2024-08-27 ENCOUNTER — APPOINTMENT (OUTPATIENT)
Dept: PHARMACY | Facility: HOSPITAL | Age: 49
End: 2024-08-27
Payer: COMMERCIAL

## 2024-08-27 DIAGNOSIS — D68.9 COAGULATION DISORDER (MULTI): Primary | ICD-10-CM

## 2024-08-27 LAB
POC INR: 2.3 (ref 2–3)
POC PROTHROMBIN TIME: 27.3

## 2024-08-27 PROCEDURE — 99211 OFF/OP EST MAY X REQ PHY/QHP: CPT

## 2024-08-27 PROCEDURE — 85610 PROTHROMBIN TIME: CPT | Mod: QW | Performed by: INTERNAL MEDICINE

## 2024-08-27 NOTE — PROGRESS NOTES
Ms. Loera is a consult from Dr. Mcnair for the management of her warfarin. She is on warfarin for antiphospholipid syndrome. She is positive for both ab IgA and beta 2 glycoprotein ab IgA. She was previously on eliquis. Dr. Mcnair wanted her to be bridged with the eliquis onto warfarin. We went over how our clinic works and what to expect. She is on topiramate and desvenlafaxine. She does smoke about a pack a day. We discussed how this impacts the INR. She started the warfarin on August 13th and started 5mg daily. She does do dark greens about once a week, she didn't have any greens this past week. She did have a pickle last night.  No changes in medications or diet. She typically eats about 1 ½ meals a day. She has been bridging with the eliquis. She will stop the eliquis. Given her INR is at 2.3, we will continue her weekly regimen to 7.5mg M,F and 5mg all other days. We will follow up next week.

## 2024-08-27 NOTE — PATIENT INSTRUCTIONS
Your INR is in range at 2.3. Please continue your weekly regimen to 7.5mg M,F and 5mg all other days. We will follow up next week.    If any questions arise do not hesitate to call us at [phone] M-F from 8:30am-4:30pm or at   [phone] after 5pm or on the weekends. Continue to monitor for any excess bleeding or bruising, especially for blood in your stool.

## 2024-09-05 ENCOUNTER — APPOINTMENT (OUTPATIENT)
Dept: PHARMACY | Facility: HOSPITAL | Age: 49
End: 2024-09-05
Payer: COMMERCIAL

## 2024-09-05 DIAGNOSIS — D68.9 COAGULATION DISORDER (MULTI): Primary | ICD-10-CM

## 2024-09-05 LAB
POC INR: 1.9 (ref 2–3)
POC PROTHROMBIN TIME: 22.6

## 2024-09-05 PROCEDURE — 85610 PROTHROMBIN TIME: CPT | Mod: QW | Performed by: INTERNAL MEDICINE

## 2024-09-05 PROCEDURE — 99211 OFF/OP EST MAY X REQ PHY/QHP: CPT

## 2024-09-05 RX ORDER — WARFARIN SODIUM 5 MG/1
TABLET ORAL
Qty: 108 TABLET | Refills: 3 | Status: SHIPPED | OUTPATIENT
Start: 2024-09-05

## 2024-09-05 NOTE — PROGRESS NOTES
Ms. Loera is a consult from Dr. Mcnair for the management of her warfarin. She is on warfarin for antiphospholipid syndrome. She is positive for both ab IgA and beta 2 glycoprotein ab IgA. She was previously on eliquis. We went over how our clinic works and what to expect. She is on topiramate and desvenlafaxine. She does smoke about a pack a day. She does do dark greens about once a week, she didn't have any greens this past week. No changes in medications or diet. She typically eats about 1 ½ meals a day. Given her INR is at 1.9, we will boost today with 7.5mg and then increase her weekly regimen to 7.5mg M,W,F and 5mg all other days. We will follow up next week.

## 2024-09-05 NOTE — PATIENT INSTRUCTIONS
Your INR is slightly low at 1.9. Please boost with 7.5mg tonight and then increase your weekly regimen to 7.5mg M,W,F and 5mg all other days. We will follow up next week.    If any questions arise do not hesitate to call us at [phone] M-F from 8:30am-4:30pm or at   [phone] after 5pm or on the weekends. Continue to monitor for any excess bleeding or bruising, especially for blood in your stool.

## 2024-09-13 ENCOUNTER — APPOINTMENT (OUTPATIENT)
Dept: PHARMACY | Facility: HOSPITAL | Age: 49
End: 2024-09-13
Payer: COMMERCIAL

## 2024-09-13 DIAGNOSIS — D68.9 COAGULATION DISORDER (MULTI): Primary | ICD-10-CM

## 2024-09-13 LAB
POC INR: 1.5 (ref 2–3)
POC PROTHROMBIN TIME: 18.3

## 2024-09-13 PROCEDURE — 99211 OFF/OP EST MAY X REQ PHY/QHP: CPT

## 2024-09-13 PROCEDURE — 85610 PROTHROMBIN TIME: CPT | Mod: QW | Performed by: INTERNAL MEDICINE

## 2024-09-13 NOTE — PATIENT INSTRUCTIONS
Your INR is low at 1.5. Please boost with 12.5mg tonight and then increase your weekly regimen to 5mg Tues, Thursday and 7.5mg all other days. We will follow up next week.    If any questions arise do not hesitate to call us at [phone] M-F from 8:30am-4:30pm or at   [phone] after 5pm or on the weekends. Continue to monitor for any excess bleeding or bruising, especially for blood in your stool.

## 2024-09-13 NOTE — PROGRESS NOTES
Ms. Loera is a consult from Dr. Mcnair for the management of her warfarin. She is on warfarin for antiphospholipid syndrome. She is positive for both ab IgA and beta 2 glycoprotein ab IgA. She was previously on eliquis. She is on topiramate and desvenlafaxine. She does smoke about a pack a day. She does do dark greens about once a week, she did have a salad last Saturday. No changes in medications or diet. She typically eats about 1 ½ meals a day. We boosted last week given a low INR. Today her INR is at 1.5. Given her INR is at 1.5, we will boost today with 12.5mg and then increase her weekly regimen to 5mg Tues, Thurs, and 7.5mg all other days. She is to avoid the greens tonight. We will follow up next week.

## 2024-09-16 ENCOUNTER — APPOINTMENT (OUTPATIENT)
Dept: PRIMARY CARE | Facility: CLINIC | Age: 49
End: 2024-09-16
Payer: COMMERCIAL

## 2024-09-16 DIAGNOSIS — Z00.00 WELLNESS EXAMINATION: Primary | ICD-10-CM

## 2024-09-16 DIAGNOSIS — G43.801 OTHER MIGRAINE WITH STATUS MIGRAINOSUS, NOT INTRACTABLE: ICD-10-CM

## 2024-09-16 DIAGNOSIS — Z12.11 SCREEN FOR COLON CANCER: ICD-10-CM

## 2024-09-16 DIAGNOSIS — F41.8 DEPRESSION WITH ANXIETY: ICD-10-CM

## 2024-09-16 PROCEDURE — 99213 OFFICE O/P EST LOW 20 MIN: CPT | Performed by: FAMILY MEDICINE

## 2024-09-16 RX ORDER — TOPIRAMATE 50 MG/1
150 TABLET, FILM COATED ORAL DAILY
Qty: 270 TABLET | Refills: 1 | Status: SHIPPED | OUTPATIENT
Start: 2024-09-16

## 2024-09-16 RX ORDER — DESVENLAFAXINE 100 MG/1
100 TABLET, EXTENDED RELEASE ORAL DAILY
Qty: 90 TABLET | Refills: 1 | Status: SHIPPED | OUTPATIENT
Start: 2024-09-16

## 2024-09-16 ASSESSMENT — ENCOUNTER SYMPTOMS
DEPRESSION: 0
LOSS OF SENSATION IN FEET: 0
OCCASIONAL FEELINGS OF UNSTEADINESS: 0

## 2024-09-16 NOTE — PROGRESS NOTES
Subjective   Patient ID: Jyotsna Loera is a 49 y.o. female who presents for Follow-up.  Chief Complaint_[facility]:  An interactive audio and video telecommunication system which permits real time communications between the patient (at the originating site) and provider (at the distant site) was utilized to provide this telehealth service.   Virtual or Telephone Consent    An interactive audio and video telecommunication system which permits real time communications between the patient (at the originating site) and provider (at the distant site) was utilized to provide this telehealth service.   Verbal consent was requested and obtained from Jyotsna Loera on this date, 09/16/24 for a telehealth visit.    HPI   Pt is doing well  No side effects from her meds  Migraines are stable  Her mood id good too  Goes to coumadin clinic having a hard time getting dose regulated  Review of Systems   All other systems reviewed and are negative.      Objective   There were no vitals taken for this visit.    Physical Exam  Constitutional:       Appearance: Normal appearance.   HENT:      Head: Normocephalic.      Right Ear: External ear normal.      Left Ear: External ear normal.      Nose: Nose normal.   Pulmonary:      Effort: Pulmonary effort is normal.   Neurological:      General: No focal deficit present.      Mental Status: She is alert and oriented to person, place, and time.   Psychiatric:         Mood and Affect: Mood normal.         Behavior: Behavior normal.         Assessment/Plan   Diagnoses and all orders for this visit:  Depression with anxiety  Other migraine with status migrainosus, not intractable    Cont regimen

## 2024-09-23 ENCOUNTER — APPOINTMENT (OUTPATIENT)
Dept: PHARMACY | Facility: HOSPITAL | Age: 49
End: 2024-09-23
Payer: COMMERCIAL

## 2024-09-23 DIAGNOSIS — D68.9 COAGULATION DISORDER (MULTI): Primary | ICD-10-CM

## 2024-09-23 LAB
POC INR: 2.4 (ref 2–3)
POC PROTHROMBIN TIME: 29.4

## 2024-09-23 PROCEDURE — 85610 PROTHROMBIN TIME: CPT | Mod: QW | Performed by: INTERNAL MEDICINE

## 2024-09-23 PROCEDURE — 99211 OFF/OP EST MAY X REQ PHY/QHP: CPT

## 2024-09-23 NOTE — PATIENT INSTRUCTIONS
Your INR is in range at 2.4. Please continue your weekly regimen of 5mg Tues, Thursday and 7.5mg all other days. We will follow up next week.  If any questions arise do not hesitate to call us at [phone] M-F from 8:30am-4:30pm or at   [phone] after 5pm or on the weekends. Continue to monitor for any excess bleeding or bruising, especially for blood in your stool.

## 2024-09-23 NOTE — PROGRESS NOTES
Ms. Loera is a consult from Dr. Mcnair for the management of her warfarin. She is on warfarin for antiphospholipid syndrome. She is positive for both ab IgA and beta 2 glycoprotein ab IgA. She was previously on eliquis. She is on topiramate and desvenlafaxine. She does smoke about a pack a day. She does do dark greens about once a week. No changes in medications or diet. She typically eats about 1 ½ meals a day. We boosted on 9/13 given low INR. Today her INR is at 2.4. Given her INR is at 2.4, we will continue her weekly regimen of 5mg Tues, Thurs, and 7.5mg all other days. We will follow up next week.

## 2024-09-30 ENCOUNTER — ANTICOAGULATION - WARFARIN VISIT (OUTPATIENT)
Dept: PHARMACY | Facility: HOSPITAL | Age: 49
End: 2024-09-30
Payer: COMMERCIAL

## 2024-09-30 DIAGNOSIS — D68.9 COAGULATION DISORDER (MULTI): Primary | ICD-10-CM

## 2024-09-30 LAB
POC INR: 2.5 (ref 2–3)
POC PROTHROMBIN TIME: 29.8

## 2024-09-30 PROCEDURE — 85610 PROTHROMBIN TIME: CPT | Mod: QW | Performed by: INTERNAL MEDICINE

## 2024-09-30 PROCEDURE — 99211 OFF/OP EST MAY X REQ PHY/QHP: CPT

## 2024-09-30 NOTE — PROGRESS NOTES
Ms. Loera is a consult from Dr. Mcnair for the management of her warfarin. She is on warfarin for antiphospholipid syndrome. She is positive for both ab IgA and beta 2 glycoprotein ab IgA. She was previously on eliquis. She is on topiramate and desvenlafaxine. She does smoke about a pack a day. She does do dark greens about once a week of spinach. No changes in medications or diet. She typically eats about 1 ½ meals a day. Today her INR is at 2.5. Given her INR is at 2.5, we will continue her weekly regimen of 5mg Tues, Thurs, and 7.5mg all other days. We will follow up in 2 weeks.

## 2024-09-30 NOTE — PATIENT INSTRUCTIONS
Your INR is in range at 2.5. Please continue your weekly regimen of 5mg Tues, Thursday and 7.5mg all other days. We will follow up in 2 weeks.  If any questions arise do not hesitate to call us at [phone] M-F from 8:30am-4:30pm or at   [phone] after 5pm or on the weekends. Continue to monitor for any excess bleeding or bruising, especially for blood in your stool.

## 2024-10-14 ENCOUNTER — APPOINTMENT (OUTPATIENT)
Dept: PHARMACY | Facility: HOSPITAL | Age: 49
End: 2024-10-14
Payer: COMMERCIAL

## 2024-10-14 DIAGNOSIS — D68.9 COAGULATION DISORDER (MULTI): Primary | ICD-10-CM

## 2024-10-14 LAB
POC INR: 2.3 (ref 2–3)
POC PROTHROMBIN TIME: 28

## 2024-10-14 PROCEDURE — 85610 PROTHROMBIN TIME: CPT | Mod: QW | Performed by: INTERNAL MEDICINE

## 2024-10-14 PROCEDURE — 99211 OFF/OP EST MAY X REQ PHY/QHP: CPT

## 2024-10-14 NOTE — PATIENT INSTRUCTIONS
Your INR is in range at 2.3. Please continue your weekly regimen of 5mg Tues, Thursday and 7.5mg all other days. We will follow up in 2 weeks.  If any questions arise do not hesitate to call us at [phone] M-F from 8:30am-4:30pm or at   [phone] after 5pm or on the weekends. Continue to monitor for any excess bleeding or bruising, especially for blood in your stool.

## 2024-10-14 NOTE — PROGRESS NOTES
Ms. Loera is a consult from Dr. Mcnair for the management of her warfarin. She is on warfarin for antiphospholipid syndrome. She is positive for both ab IgA and beta 2 glycoprotein ab IgA. She was previously on eliquis. She is on topiramate and desvenlafaxine. She does smoke about a pack a day. She does do dark greens about once a week of spinach. No changes in medications or diet. Today her INR is at 2.3. Given her INR is at 2.3, we will continue her weekly regimen of 5mg Tues, Thurs, and 7.5mg all other days. We will follow up in 2 weeks.

## 2024-10-28 ENCOUNTER — ANTICOAGULATION - WARFARIN VISIT (OUTPATIENT)
Dept: PHARMACY | Facility: HOSPITAL | Age: 49
End: 2024-10-28
Payer: COMMERCIAL

## 2024-10-28 DIAGNOSIS — D68.9 COAGULATION DISORDER (MULTI): Primary | ICD-10-CM

## 2024-10-28 LAB
POC INR: 2.7 (ref 2–3)
POC PROTHROMBIN TIME: 31.8

## 2024-10-28 PROCEDURE — 85610 PROTHROMBIN TIME: CPT | Mod: QW | Performed by: INTERNAL MEDICINE

## 2024-10-28 PROCEDURE — 99211 OFF/OP EST MAY X REQ PHY/QHP: CPT

## 2024-11-20 ENCOUNTER — APPOINTMENT (OUTPATIENT)
Dept: PHARMACY | Facility: HOSPITAL | Age: 49
End: 2024-11-20
Payer: COMMERCIAL

## 2024-11-20 DIAGNOSIS — D68.9 COAGULATION DISORDER (MULTI): Primary | ICD-10-CM

## 2024-11-20 LAB
POC INR: 2 (ref 2–3)
POC PROTHROMBIN TIME: 23.5

## 2024-11-20 PROCEDURE — 99211 OFF/OP EST MAY X REQ PHY/QHP: CPT

## 2024-11-20 PROCEDURE — 85610 PROTHROMBIN TIME: CPT | Mod: QW | Performed by: INTERNAL MEDICINE

## 2024-11-20 NOTE — PATIENT INSTRUCTIONS
Your INR is in range at 2.0. Please continue your weekly regimen of 5mg Tues, Thursday and 7.5mg all other days. We will follow up in 4 weeks.  If any questions arise do not hesitate to call us at [phone] M-F from 8:30am-4:30pm or at   [phone] after 5pm or on the weekends. Continue to monitor for any excess bleeding or bruising, especially for blood in your stool.

## 2024-12-18 ENCOUNTER — ANTICOAGULATION - WARFARIN VISIT (OUTPATIENT)
Dept: PHARMACY | Facility: HOSPITAL | Age: 49
End: 2024-12-18
Payer: COMMERCIAL

## 2024-12-18 DIAGNOSIS — D68.9 COAGULATION DISORDER (MULTI): Primary | ICD-10-CM

## 2024-12-18 LAB
POC INR: 2 (ref 2–3)
POC PROTHROMBIN TIME: 24.4

## 2024-12-18 PROCEDURE — 85610 PROTHROMBIN TIME: CPT | Mod: QW | Performed by: INTERNAL MEDICINE

## 2024-12-18 PROCEDURE — 99211 OFF/OP EST MAY X REQ PHY/QHP: CPT

## 2024-12-18 NOTE — PATIENT INSTRUCTIONS
Your INR is in range at 2.0. Please continue your weekly regimen of 5mg Tues, Thursday and 7.5mg all other days. We will follow up in 4 weeks.  If any questions arise do not hesitate to call us at [phone] M-F from 8:30am-4:30pm or at   [phone] after 5pm or on the weekends. Continue to monitor for any excess bleeding or bruising, especially for blood in your stool.

## 2024-12-18 NOTE — PROGRESS NOTES
Ms. Loera is a consult from Dr. Mcnair for the management of her warfarin. She is on warfarin for antiphospholipid syndrome. She is positive for both ab IgA and beta 2 glycoprotein ab IgA. She was previously on eliquis. She is on topiramate and desvenlafaxine. She does smoke about a pack a day. She does do dark greens about once a week of spinach. She does a cold and is taking some cold medicine. No changes in medications or diet. Today her INR is at 2.0. Given her INR is at 2.0, we will continue her weekly regimen of 5mg Tues, Thurs, and 7.5mg all other days. We will follow up in 4 weeks.   Silver Nitrate Text: The wound bed was treated with silver nitrate after the biopsy was performed.

## 2025-01-15 ENCOUNTER — APPOINTMENT (OUTPATIENT)
Dept: PHARMACY | Facility: HOSPITAL | Age: 50
End: 2025-01-15
Payer: COMMERCIAL

## 2025-01-16 ENCOUNTER — ANTICOAGULATION - WARFARIN VISIT (OUTPATIENT)
Dept: PHARMACY | Facility: HOSPITAL | Age: 50
End: 2025-01-16
Payer: COMMERCIAL

## 2025-01-16 DIAGNOSIS — D68.9 COAGULATION DISORDER (MULTI): Primary | ICD-10-CM

## 2025-01-16 LAB
POC INR: 1.7 (ref 2–3)
POC PROTHROMBIN TIME: 20.1

## 2025-01-16 PROCEDURE — 99211 OFF/OP EST MAY X REQ PHY/QHP: CPT

## 2025-01-16 PROCEDURE — 85610 PROTHROMBIN TIME: CPT | Mod: QW | Performed by: INTERNAL MEDICINE

## 2025-01-16 NOTE — PROGRESS NOTES
Ms. Loera is a consult from Dr. Mcnair for the management of her warfarin. She is on warfarin for antiphospholipid syndrome. She is positive for both ab IgA and beta 2 glycoprotein ab IgA. She was previously on eliquis. She is on topiramate and desvenlafaxine. She does smoke about a pack a day. She does do dark greens about once a month of spinach. No changes in medications or diet. Today her INR is at 1.7. Given her INR is at 1.7, we will boost today with 10mg and then increase her weekly regimen of 5mg Tues, and 7.5mg all other days. This is to account for the recently lower INRs. We will follow up in 2 weeks.

## 2025-01-16 NOTE — PATIENT INSTRUCTIONS
Your INR is low at 1.7. Please take 10mg tonight and then increase your weekly regimen of 5mg Tues, and 7.5mg all other days. We will follow up in 2 weeks.    If any questions arise do not hesitate to call us at [phone] M-F from 8:30am-4:30pm or at   [phone] after 5pm or on the weekends. Continue to monitor for any excess bleeding or bruising, especially for blood in your stool.

## 2025-01-29 ENCOUNTER — APPOINTMENT (OUTPATIENT)
Dept: PHARMACY | Facility: HOSPITAL | Age: 50
End: 2025-01-29
Payer: COMMERCIAL

## 2025-01-29 DIAGNOSIS — D68.9 COAGULATION DISORDER (MULTI): Primary | ICD-10-CM

## 2025-01-29 LAB
POC INR: 2.4 (ref 2–3)
POC PROTHROMBIN TIME: 28.2

## 2025-01-29 PROCEDURE — 85610 PROTHROMBIN TIME: CPT | Mod: QW

## 2025-01-29 RX ORDER — WARFARIN SODIUM 5 MG/1
TABLET ORAL
Qty: 120 TABLET | Refills: 3 | Status: SHIPPED | OUTPATIENT
Start: 2025-01-29

## 2025-01-29 NOTE — PATIENT INSTRUCTIONS
Your INR is in range at 2.4. Please continue your weekly regimen of 5mg Tues, and 7.5mg all other days. We will follow up in 3 weeks.  If any questions arise do not hesitate to call us at [phone] M-F from 8:30am-4:30pm or at   [phone] after 5pm or on the weekends. Continue to monitor for any excess bleeding or bruising, especially for blood in your stool.

## 2025-01-29 NOTE — PROGRESS NOTES
Ms. Loera is a consult from Dr. Mcnair for the management of her warfarin. She is on warfarin for antiphospholipid syndrome. She is positive for both ab IgA and beta 2 glycoprotein ab IgA. She was previously on eliquis. She is on topiramate and desvenlafaxine. She does smoke about a pack a day. She does do dark greens about once a month of spinach. No changes in medications or diet. Today her INR is at 2.4. Given her INR is at 2.4, we will continue her weekly regimen of 5mg Tues, and 7.5mg all other days. We will follow up in 3 weeks.

## 2025-02-20 ENCOUNTER — APPOINTMENT (OUTPATIENT)
Dept: PHARMACY | Facility: HOSPITAL | Age: 50
End: 2025-02-20
Payer: COMMERCIAL

## 2025-02-20 DIAGNOSIS — D68.9 COAGULATION DISORDER (MULTI): Primary | ICD-10-CM

## 2025-02-20 LAB
POC INR: 1.9 (ref 2–3)
POC PROTHROMBIN TIME: 23.4

## 2025-02-20 PROCEDURE — 99211 OFF/OP EST MAY X REQ PHY/QHP: CPT

## 2025-02-20 PROCEDURE — 85610 PROTHROMBIN TIME: CPT | Mod: QW | Performed by: INTERNAL MEDICINE

## 2025-02-20 NOTE — PATIENT INSTRUCTIONS
Your INR is low at 1.9. Please take 10mg today and then increase your weekly regimen to  7.5mg daily. We will follow up in about 2 weeks.  If any questions arise do not hesitate to call us at [phone] M-F from 8:30am-4:30pm or at   [phone] after 5pm or on the weekends. Continue to monitor for any excess bleeding or bruising, especially for blood in your stool.

## 2025-02-20 NOTE — PROGRESS NOTES
Ms. Loera is a consult from Dr. Mcnair for the management of her warfarin. She is on warfarin for antiphospholipid syndrome. She is positive for both ab IgA and beta 2 glycoprotein ab IgA. She was previously on eliquis. She is on topiramate and desvenlafaxine. She does smoke about a pack a day. She does do dark greens about once a month of spinach. She hasn't had spinach this week. No changes in medications or diet. Today her INR is at 1.9. Given her INR is low at 1.9, we will boost today with 10mg and then increase her weekly regimen to 7.5mg daily. We will follow up in 1 week.

## 2025-03-07 ENCOUNTER — APPOINTMENT (OUTPATIENT)
Dept: PHARMACY | Facility: HOSPITAL | Age: 50
End: 2025-03-07
Payer: COMMERCIAL

## 2025-03-07 DIAGNOSIS — D68.9 COAGULATION DISORDER (MULTI): Primary | ICD-10-CM

## 2025-03-07 DIAGNOSIS — R76.0 ANTIPHOSPHOLIPID ANTIBODY POSITIVE: ICD-10-CM

## 2025-03-07 LAB
POC INR: 2.4 (ref 2–3)
POC PROTHROMBIN TIME: 28.3

## 2025-03-07 PROCEDURE — 99211 OFF/OP EST MAY X REQ PHY/QHP: CPT

## 2025-03-07 PROCEDURE — 85610 PROTHROMBIN TIME: CPT | Mod: QW | Performed by: INTERNAL MEDICINE

## 2025-03-07 NOTE — PATIENT INSTRUCTIONS
Your INR is in range at 2.4. Please continue your weekly regimen to 7.5mg daily. We will follow up in about 3 weeks.    If any questions arise do not hesitate to call us at [phone] M-F from 8:30am-4:30pm or at   [phone] after 5pm or on the weekends. Continue to monitor for any excess bleeding or bruising, especially for blood in your stool.

## 2025-03-07 NOTE — PROGRESS NOTES
Ms. Loera is a consult from Dr. Mcnair for the management of her warfarin. She is on warfarin for antiphospholipid syndrome. She is positive for both ab IgA and beta 2 glycoprotein ab IgA. She was previously on eliquis. She is on topiramate and desvenlafaxine. She does smoke about a pack a day. She does do dark greens about once a month of spinach. She hasn't had spinach this week. No changes in medications or diet. Today her INR is at 2.4 after a slight increase in her dosage a few weeks ago. Given her INR is in range at 2.4, we will continue her weekly regimen to 7.5mg daily. We will follow up in 3 weeks.

## 2025-03-27 ENCOUNTER — ANTICOAGULATION - WARFARIN VISIT (OUTPATIENT)
Dept: PHARMACY | Facility: HOSPITAL | Age: 50
End: 2025-03-27
Payer: COMMERCIAL

## 2025-03-27 ENCOUNTER — APPOINTMENT (OUTPATIENT)
Dept: PHARMACY | Facility: HOSPITAL | Age: 50
End: 2025-03-27
Payer: COMMERCIAL

## 2025-03-27 DIAGNOSIS — D68.9 COAGULATION DISORDER (MULTI): Primary | ICD-10-CM

## 2025-03-27 LAB
POC INR: 2.4 (ref 2–3)
POC PROTHROMBIN TIME: 28.5

## 2025-03-27 PROCEDURE — 85610 PROTHROMBIN TIME: CPT | Mod: QW | Performed by: INTERNAL MEDICINE

## 2025-03-27 PROCEDURE — 99211 OFF/OP EST MAY X REQ PHY/QHP: CPT

## 2025-03-27 NOTE — PATIENT INSTRUCTIONS
Your INR is in range at 2.4. Please continue your weekly regimen to 7.5mg daily. We will follow up in about 4 weeks.    If any questions arise do not hesitate to call us at [phone] M-F from 8:30am-4:30pm or at   [phone] after 5pm or on the weekends. Continue to monitor for any excess bleeding or bruising, especially for blood in your stool.

## 2025-03-27 NOTE — PROGRESS NOTES
Ms. Loera is a consult from Dr. Mcnair for the management of her warfarin. She is on warfarin for antiphospholipid syndrome. She is positive for both ab IgA and beta 2 glycoprotein ab IgA. She was previously on eliquis. She is on topiramate and desvenlafaxine. She does smoke about a pack a day. She does do dark greens about once a month of spinach. She hasn't had spinach this week. No changes in medications or diet. Given her INR is in range at 2.4, we will continue her weekly regimen to 7.5mg daily. We will follow up in 4 weeks.

## 2025-04-30 ENCOUNTER — ANTICOAGULATION - WARFARIN VISIT (OUTPATIENT)
Dept: PHARMACY | Facility: HOSPITAL | Age: 50
End: 2025-04-30
Payer: COMMERCIAL

## 2025-04-30 DIAGNOSIS — D68.9 COAGULATION DISORDER (MULTI): Primary | ICD-10-CM

## 2025-04-30 LAB
POC INR: 2.9 (ref 2–3)
POC PROTHROMBIN TIME: 35.1 (ref 9.3–12.5)

## 2025-04-30 PROCEDURE — 99211 OFF/OP EST MAY X REQ PHY/QHP: CPT

## 2025-04-30 PROCEDURE — 85610 PROTHROMBIN TIME: CPT | Mod: QW | Performed by: INTERNAL MEDICINE

## 2025-04-30 NOTE — PROGRESS NOTES
Ms. Loera is a consult from Dr. Mcnair for the management of her warfarin. She is on warfarin for antiphospholipid syndrome. She is positive for both ab IgA and beta 2 glycoprotein ab IgA. She was previously on eliquis. She is on topiramate and desvenlafaxine. She does smoke about a pack a day but hasn't in a week. She has been taking clindamycin for a week. Was in the hospital Wednesday-Friday of last week. She was taking ciprofloxacin prior to hospitalization. They held her warfarin last Thursday. Her tooth infection is improving but she is still not eating well. She said she can only eat soft foods. She does do dark greens about once a month of spinach. She had spinach this week. Given her INR is in range at 2.9, we will have her take 5mg tonight and then continue her weekly regimen to 7.5mg daily. We will follow up in 2 weeks. We discussed if eating does not improve by next week to call and inform the clinic.

## 2025-04-30 NOTE — PATIENT INSTRUCTIONS
Your INR is in range at 2.9. Please take 5mg tonight and then continue your weekly regimen to 7.5mg daily. We will follow up in about 2 weeks.  If any questions arise do not hesitate to call us at [phone] M-F from 8:30am-4:30pm or at   [phone] after 5pm or on the weekends. Continue to monitor for any excess bleeding or bruising, especially for blood in your stool.

## 2025-05-01 ENCOUNTER — APPOINTMENT (OUTPATIENT)
Dept: PHARMACY | Facility: HOSPITAL | Age: 50
End: 2025-05-01
Payer: COMMERCIAL

## 2025-05-19 ENCOUNTER — ANTICOAGULATION - WARFARIN VISIT (OUTPATIENT)
Dept: PHARMACY | Facility: HOSPITAL | Age: 50
End: 2025-05-19
Payer: COMMERCIAL

## 2025-05-19 DIAGNOSIS — R76.0 ANTIPHOSPHOLIPID ANTIBODY POSITIVE: ICD-10-CM

## 2025-05-19 DIAGNOSIS — D68.9 COAGULATION DISORDER (MULTI): Primary | ICD-10-CM

## 2025-05-19 LAB
POC INR: 8 (ref 2–3)
POC PROTHROMBIN TIME: 96 (ref 9.3–12.5)

## 2025-05-19 PROCEDURE — 99211 OFF/OP EST MAY X REQ PHY/QHP: CPT

## 2025-05-19 PROCEDURE — 85610 PROTHROMBIN TIME: CPT | Mod: QW | Performed by: INTERNAL MEDICINE

## 2025-05-19 NOTE — PATIENT INSTRUCTIONS
Your INR is elevated at 8. Please hold today and tomorrow. Please eat uzair greens and spinach. Follow up Wednesday. If you have any signs or symptoms of bleeding go to the ER.    If any questions arise do not hesitate to call us at [phone] M-F from 8:30am-4:30pm or at   [phone] after 5pm or on the weekends. Continue to monitor for any excess bleeding or bruising, especially for blood in your stool.

## 2025-05-19 NOTE — PROGRESS NOTES
Ms. Loera is a consult from Dr. Mcnair for the management of her warfarin. She is on warfarin for antiphospholipid syndrome. She is positive for both ab IgA and beta 2 glycoprotein ab IgA. She was previously on eliquis. She is on topiramate and desvenlafaxine. She does smoke about a pack a day but hasn't in a few weeks. Her tooth infection is improving and she is eating better. She did report that she has been on Flagyl since May 13th. We discussed any time there are changes in medications to call and inform the clinic. Given her INR is elevated above 8, we will have her hold today and tomorrow. We did inform Dr. Mcnair and updated him with the plan. No signs or symptoms of bleeding. She is going to go to lab for a blood draw for a more accurate INR reading. Follow up Wednesday.

## 2025-05-20 LAB
INR PPP: 7.9
PROTHROMBIN TIME: 71.6 SEC (ref 9–11.5)

## 2025-05-21 ENCOUNTER — ANTICOAGULATION - WARFARIN VISIT (OUTPATIENT)
Dept: PHARMACY | Facility: HOSPITAL | Age: 50
End: 2025-05-21
Payer: COMMERCIAL

## 2025-05-21 DIAGNOSIS — D68.9 COAGULATION DISORDER (MULTI): Primary | ICD-10-CM

## 2025-05-21 LAB
POC INR: 2.3 (ref 2–3)
POC PROTHROMBIN TIME: 28.1 (ref 9.3–12.5)

## 2025-05-21 PROCEDURE — 99211 OFF/OP EST MAY X REQ PHY/QHP: CPT

## 2025-05-21 PROCEDURE — 85610 PROTHROMBIN TIME: CPT | Mod: QW | Performed by: INTERNAL MEDICINE

## 2025-05-21 NOTE — PROGRESS NOTES
Ms. Loera is a consult from Dr. Mcnair for the management of her warfarin. She is on warfarin for antiphospholipid syndrome. She is positive for both ab IgA and beta 2 glycoprotein ab IgA. She was previously on eliquis. She is on topiramate and desvenlafaxine. She does smoke about a pack a day but hasn't in a few weeks. Her tooth infection is improving and she is eating better. She did report that she has been on Flagyl since May 13th. Today is her last dose. She did report that she was eating a bunch of greens the past two days given she had a supratherapeutic INR of 7.9.  We discussed any time there are changes in medications to call and inform the clinic. Given her INR is back in range at 2.3, we will have resume her weekly regimen of 7.5mg daily. She will eat greens tonight. Follow up next week.

## 2025-05-27 ENCOUNTER — ANTICOAGULATION - WARFARIN VISIT (OUTPATIENT)
Dept: PHARMACY | Facility: HOSPITAL | Age: 50
End: 2025-05-27
Payer: COMMERCIAL

## 2025-05-27 DIAGNOSIS — D68.9 COAGULATION DISORDER (MULTI): Primary | ICD-10-CM

## 2025-05-27 LAB
POC INR: 3.8 (ref 2–3)
POC PROTHROMBIN TIME: 46.1 (ref 9.3–12.5)

## 2025-05-27 PROCEDURE — 99211 OFF/OP EST MAY X REQ PHY/QHP: CPT

## 2025-05-27 PROCEDURE — 85610 PROTHROMBIN TIME: CPT | Mod: QW | Performed by: INTERNAL MEDICINE

## 2025-05-27 NOTE — PATIENT INSTRUCTIONS
Your INR is elevated at 3.8. Please hold today and then decrease your regimen temporarily to 5mg Wed,Friday, Sat  and 7.5mg all other days. Follow up next Monday.  If any questions arise do not hesitate to call us at [phone] M-F from 8:30am-4:30pm or at   [phone] after 5pm or on the weekends. Continue to monitor for any excess bleeding or bruising, especially for blood in your stool.

## 2025-05-27 NOTE — PROGRESS NOTES
Ms. Loera is a consult from Dr. Mcnair for the management of her warfarin. She is on warfarin for antiphospholipid syndrome. She is positive for both ab IgA and beta 2 glycoprotein ab IgA. She was previously on eliquis. She is on topiramate and desvenlafaxine. She used to smoke about a pack a day.  It has now been about a month of her not smoking. Her tooth infection is improving and she is eating better. She was on Flagyl since May 13th, her last dose was last Friday. She resumed her home regimen at that time. Given her INR is elevated at 3.8, we will hold today and temporarily reduce her regimen to 5mg Wed, Frida, Sat and 7.5mg all other days. Follow up next week.

## 2025-06-02 ENCOUNTER — ANTICOAGULATION - WARFARIN VISIT (OUTPATIENT)
Dept: PHARMACY | Facility: HOSPITAL | Age: 50
End: 2025-06-02
Payer: COMMERCIAL

## 2025-06-02 DIAGNOSIS — D68.9 COAGULATION DISORDER (MULTI): Primary | ICD-10-CM

## 2025-06-02 LAB
POC INR: 1.7 (ref 2–3)
POC PROTHROMBIN TIME: 19.8 (ref 9.3–12.5)

## 2025-06-02 PROCEDURE — 99211 OFF/OP EST MAY X REQ PHY/QHP: CPT

## 2025-06-02 PROCEDURE — 85610 PROTHROMBIN TIME: CPT | Mod: QW | Performed by: INTERNAL MEDICINE

## 2025-06-02 NOTE — PATIENT INSTRUCTIONS
Your INR is low at 1.7. Please decrease your regimen to 5mg Tues, Thurs, Sat and 7.5mg all other days. Follow up next Monday.    If any questions arise do not hesitate to call us at [phone] M-F from 8:30am-4:30pm or at   [phone] after 5pm or on the weekends. Continue to monitor for any excess bleeding or bruising, especially for blood in your stool.

## 2025-06-02 NOTE — PROGRESS NOTES
Ms. Loera is a consult from Dr. Mcnair for the management of her warfarin. She is on warfarin for antiphospholipid syndrome. She is positive for both ab IgA and beta 2 glycoprotein ab IgA. She was previously on eliquis. She is on topiramate and desvenlafaxine. She used to smoke about a pack a day.  It has now been about a month of her not smoking. Her tooth infection is improving and she is eating better. She was on Flagyl since May 13th.  She was on antibiotics for about a month. She reports that she was having diarrhea for a month due to the antibiotics but it is now improving.  Given her INR is low at 1.7, we will reduce her regimen of 5mg Tues, Thurs, Sat, and 7.5mg all other days. This is to account for the changes in her smoking habits. Follow up next week.

## 2025-06-10 ENCOUNTER — ANTICOAGULATION - WARFARIN VISIT (OUTPATIENT)
Dept: PHARMACY | Facility: HOSPITAL | Age: 50
End: 2025-06-10
Payer: COMMERCIAL

## 2025-06-10 DIAGNOSIS — D68.9 COAGULATION DISORDER (MULTI): Primary | ICD-10-CM

## 2025-06-10 LAB
POC INR: 2.3 (ref 2–3)
POC PROTHROMBIN TIME: 27.6 (ref 9.3–12.5)

## 2025-06-10 PROCEDURE — 85610 PROTHROMBIN TIME: CPT | Mod: QW | Performed by: INTERNAL MEDICINE

## 2025-06-10 PROCEDURE — 99211 OFF/OP EST MAY X REQ PHY/QHP: CPT

## 2025-06-10 NOTE — PATIENT INSTRUCTIONS
Your INR is in range at 2.3. Please continue your regimen to 5mg Tues, Thurs, Sat and 7.5mg all other days. Follow up in 2 weeks.    If any questions arise do not hesitate to call us at [phone] M-F from 8:30am-4:30pm or at   [phone] after 5pm or on the weekends. Continue to monitor for any excess bleeding or bruising, especially for blood in your stool.

## 2025-06-10 NOTE — PROGRESS NOTES
Ms. Loera is a consult from Dr. Mcnair for the management of her warfarin. She is on warfarin for antiphospholipid syndrome. She is positive for both ab IgA and beta 2 glycoprotein ab IgA. She was previously on eliquis. She is on topiramate and desvenlafaxine. She used to smoke about a pack a day.  It has now been about a month of her not smoking. Her tooth infection is improving and she is eating better. She was on Flagyl since May 13th.  She was on antibiotics for about a month. She reports that she was having diarrhea for a month due to the antibiotics but it is now improving and continues to get better. Given her INR is in range at 2.3, we will continue her regimen  of 5mg Tues, Thurs, Sat, and 7.5mg all other days. This is to account for the changes in her smoking habits. Follow up in 2 weeks.

## 2025-06-19 ENCOUNTER — HOSPITAL ENCOUNTER (OUTPATIENT)
Dept: RADIOLOGY | Facility: CLINIC | Age: 50
Discharge: HOME | End: 2025-06-19
Payer: COMMERCIAL

## 2025-06-19 DIAGNOSIS — Z12.31 SCREENING MAMMOGRAM FOR BREAST CANCER: ICD-10-CM

## 2025-06-19 PROCEDURE — 77067 SCR MAMMO BI INCL CAD: CPT

## 2025-06-19 PROCEDURE — 77063 BREAST TOMOSYNTHESIS BI: CPT | Performed by: RADIOLOGY

## 2025-06-19 PROCEDURE — 77067 SCR MAMMO BI INCL CAD: CPT | Performed by: RADIOLOGY

## 2025-06-24 ENCOUNTER — ANTICOAGULATION - WARFARIN VISIT (OUTPATIENT)
Dept: PHARMACY | Facility: HOSPITAL | Age: 50
End: 2025-06-24
Payer: COMMERCIAL

## 2025-06-24 DIAGNOSIS — D68.9 COAGULATION DISORDER (MULTI): Primary | ICD-10-CM

## 2025-06-24 LAB
POC INR: 2.1 (ref 2–3)
POC PROTHROMBIN TIME: 24.6 (ref 9.3–12.5)

## 2025-06-24 PROCEDURE — 99211 OFF/OP EST MAY X REQ PHY/QHP: CPT

## 2025-06-24 PROCEDURE — 85610 PROTHROMBIN TIME: CPT | Mod: QW | Performed by: INTERNAL MEDICINE

## 2025-06-24 NOTE — PATIENT INSTRUCTIONS
Your INR is in range at 2.1. Please continue your regimen to 5mg Tues, Thurs, Sat and 7.5mg all other days. Follow up in about 4 weeks per patient request.    If any questions arise do not hesitate to call us at [phone] M-F from 8:30am-4:30pm or at   [phone] after 5pm or on the weekends. Continue to monitor for any excess bleeding or bruising, especially for blood in your stool.

## 2025-06-24 NOTE — PROGRESS NOTES
Ms. Loera is a consult from Dr. Mcnair for the management of her warfarin. She is on warfarin for antiphospholipid syndrome. She is positive for both ab IgA and beta 2 glycoprotein ab IgA. She was previously on eliquis. She is on topiramate and desvenlafaxine. She used to smoke about a pack a day.  It has now been over a month of her not smoking. Her INRs have been back in range.  Given her INR is in range at 2.1, we will continue her regimen of 5mg Tues, Thurs, Sat, and 7.5mg all other days. Follow up in about 4 weeks.

## 2025-07-21 ENCOUNTER — ANTICOAGULATION - WARFARIN VISIT (OUTPATIENT)
Dept: PHARMACY | Facility: HOSPITAL | Age: 50
End: 2025-07-21
Payer: COMMERCIAL

## 2025-07-21 DIAGNOSIS — D68.9 COAGULATION DISORDER (MULTI): Primary | ICD-10-CM

## 2025-07-21 LAB
POC INR: 2.7 (ref 2–3)
POC PROTHROMBIN TIME: 32.2 (ref 9.3–12.5)

## 2025-07-21 PROCEDURE — 85610 PROTHROMBIN TIME: CPT | Mod: QW | Performed by: INTERNAL MEDICINE

## 2025-07-21 PROCEDURE — 99211 OFF/OP EST MAY X REQ PHY/QHP: CPT

## 2025-07-21 NOTE — PROGRESS NOTES
Ms. Loera is a consult from Dr. Mcnair for the management of her warfarin. She is on warfarin for antiphospholipid syndrome. She is positive for both ab IgA and beta 2 glycoprotein ab IgA. She was previously on eliquis. She is on topiramate and desvenlafaxine. She used to smoke about a pack a day.  It has now been over a month of her not smoking. Her INRs have been back in range.  Given her INR is in range at 2.7, we will continue her regimen of 5mg Tues, Thurs, Sat, and 7.5mg all other days. Follow up in about 6 weeks.

## 2025-07-21 NOTE — PATIENT INSTRUCTIONS
Your INR is in range at 2.7. Please continue your regimen to 5mg Tues, Thurs, Sat and 7.5mg all other days. Follow up in about 6 weeks.  If any questions arise do not hesitate to call us at [phone] M-F from 8:30am-4:30pm or at   [phone] after 5pm or on the weekends. Continue to monitor for any excess bleeding or bruising, especially for blood in your stool.

## 2025-09-03 ENCOUNTER — ANTICOAGULATION - WARFARIN VISIT (OUTPATIENT)
Dept: PHARMACY | Facility: HOSPITAL | Age: 50
End: 2025-09-03
Payer: COMMERCIAL

## 2025-09-03 DIAGNOSIS — D68.9 COAGULATION DISORDER (MULTI): Primary | ICD-10-CM

## 2025-09-03 LAB
POC INR: 2 (ref 2–3)
POC PROTHROMBIN TIME: 23.4 (ref 9.3–12.5)

## 2025-09-03 PROCEDURE — 85610 PROTHROMBIN TIME: CPT | Mod: QW | Performed by: INTERNAL MEDICINE

## 2025-09-03 PROCEDURE — 99211 OFF/OP EST MAY X REQ PHY/QHP: CPT
